# Patient Record
Sex: MALE | Race: ASIAN | NOT HISPANIC OR LATINO | URBAN - METROPOLITAN AREA
[De-identification: names, ages, dates, MRNs, and addresses within clinical notes are randomized per-mention and may not be internally consistent; named-entity substitution may affect disease eponyms.]

---

## 2020-05-01 ENCOUNTER — INPATIENT (INPATIENT)
Facility: HOSPITAL | Age: 65
LOS: 2 days | Discharge: HOME | End: 2020-05-04
Attending: INTERNAL MEDICINE | Admitting: INTERNAL MEDICINE
Payer: COMMERCIAL

## 2020-05-01 VITALS
OXYGEN SATURATION: 98 % | RESPIRATION RATE: 18 BRPM | HEART RATE: 69 BPM | SYSTOLIC BLOOD PRESSURE: 174 MMHG | DIASTOLIC BLOOD PRESSURE: 76 MMHG | TEMPERATURE: 97 F

## 2020-05-01 LAB
ALBUMIN SERPL ELPH-MCNC: 4.4 G/DL — SIGNIFICANT CHANGE UP (ref 3.5–5.2)
ALP SERPL-CCNC: 49 U/L — SIGNIFICANT CHANGE UP (ref 30–115)
ALT FLD-CCNC: 24 U/L — SIGNIFICANT CHANGE UP (ref 0–41)
AMMONIA BLD-MCNC: 30 UMOL/L — SIGNIFICANT CHANGE UP (ref 11–55)
ANION GAP SERPL CALC-SCNC: 12 MMOL/L — SIGNIFICANT CHANGE UP (ref 7–14)
APTT BLD: 34 SEC — SIGNIFICANT CHANGE UP (ref 27–39.2)
AST SERPL-CCNC: 24 U/L — SIGNIFICANT CHANGE UP (ref 0–41)
BASOPHILS # BLD AUTO: 0.1 K/UL — SIGNIFICANT CHANGE UP (ref 0–0.2)
BASOPHILS NFR BLD AUTO: 1.1 % — HIGH (ref 0–1)
BILIRUB SERPL-MCNC: 0.7 MG/DL — SIGNIFICANT CHANGE UP (ref 0.2–1.2)
BUN SERPL-MCNC: 20 MG/DL — SIGNIFICANT CHANGE UP (ref 10–20)
CALCIUM SERPL-MCNC: 9.6 MG/DL — SIGNIFICANT CHANGE UP (ref 8.5–10.1)
CHLORIDE SERPL-SCNC: 102 MMOL/L — SIGNIFICANT CHANGE UP (ref 98–110)
CO2 SERPL-SCNC: 27 MMOL/L — SIGNIFICANT CHANGE UP (ref 17–32)
CREAT SERPL-MCNC: 1 MG/DL — SIGNIFICANT CHANGE UP (ref 0.7–1.5)
EOSINOPHIL # BLD AUTO: 0.45 K/UL — SIGNIFICANT CHANGE UP (ref 0–0.7)
EOSINOPHIL NFR BLD AUTO: 5 % — SIGNIFICANT CHANGE UP (ref 0–8)
GLUCOSE SERPL-MCNC: 144 MG/DL — HIGH (ref 70–99)
HCT VFR BLD CALC: 48.9 % — SIGNIFICANT CHANGE UP (ref 42–52)
HGB BLD-MCNC: 16.4 G/DL — SIGNIFICANT CHANGE UP (ref 14–18)
IMM GRANULOCYTES NFR BLD AUTO: 0.9 % — HIGH (ref 0.1–0.3)
INR BLD: 1.09 RATIO — SIGNIFICANT CHANGE UP (ref 0.65–1.3)
LYMPHOCYTES # BLD AUTO: 2.26 K/UL — SIGNIFICANT CHANGE UP (ref 1.2–3.4)
LYMPHOCYTES # BLD AUTO: 25.1 % — SIGNIFICANT CHANGE UP (ref 20.5–51.1)
MAGNESIUM SERPL-MCNC: 2 MG/DL — SIGNIFICANT CHANGE UP (ref 1.8–2.4)
MCHC RBC-ENTMCNC: 30.8 PG — SIGNIFICANT CHANGE UP (ref 27–31)
MCHC RBC-ENTMCNC: 33.5 G/DL — SIGNIFICANT CHANGE UP (ref 32–37)
MCV RBC AUTO: 91.9 FL — SIGNIFICANT CHANGE UP (ref 80–94)
MONOCYTES # BLD AUTO: 0.76 K/UL — HIGH (ref 0.1–0.6)
MONOCYTES NFR BLD AUTO: 8.4 % — SIGNIFICANT CHANGE UP (ref 1.7–9.3)
NEUTROPHILS # BLD AUTO: 5.35 K/UL — SIGNIFICANT CHANGE UP (ref 1.4–6.5)
NEUTROPHILS NFR BLD AUTO: 59.5 % — SIGNIFICANT CHANGE UP (ref 42.2–75.2)
NRBC # BLD: 0 /100 WBCS — SIGNIFICANT CHANGE UP (ref 0–0)
PLATELET # BLD AUTO: 247 K/UL — SIGNIFICANT CHANGE UP (ref 130–400)
POTASSIUM SERPL-MCNC: 4.4 MMOL/L — SIGNIFICANT CHANGE UP (ref 3.5–5)
POTASSIUM SERPL-SCNC: 4.4 MMOL/L — SIGNIFICANT CHANGE UP (ref 3.5–5)
PROT SERPL-MCNC: 6.9 G/DL — SIGNIFICANT CHANGE UP (ref 6–8)
PROTHROM AB SERPL-ACNC: 12.5 SEC — SIGNIFICANT CHANGE UP (ref 9.95–12.87)
RBC # BLD: 5.32 M/UL — SIGNIFICANT CHANGE UP (ref 4.7–6.1)
RBC # FLD: 12.3 % — SIGNIFICANT CHANGE UP (ref 11.5–14.5)
SODIUM SERPL-SCNC: 141 MMOL/L — SIGNIFICANT CHANGE UP (ref 135–146)
TROPONIN T SERPL-MCNC: <0.01 NG/ML — SIGNIFICANT CHANGE UP
WBC # BLD: 9 K/UL — SIGNIFICANT CHANGE UP (ref 4.8–10.8)
WBC # FLD AUTO: 9 K/UL — SIGNIFICANT CHANGE UP (ref 4.8–10.8)

## 2020-05-01 PROCEDURE — 70496 CT ANGIOGRAPHY HEAD: CPT | Mod: 26

## 2020-05-01 PROCEDURE — 70498 CT ANGIOGRAPHY NECK: CPT | Mod: 26

## 2020-05-01 PROCEDURE — 70450 CT HEAD/BRAIN W/O DYE: CPT | Mod: 26

## 2020-05-01 PROCEDURE — 71045 X-RAY EXAM CHEST 1 VIEW: CPT | Mod: 26

## 2020-05-01 PROCEDURE — 99285 EMERGENCY DEPT VISIT HI MDM: CPT

## 2020-05-01 PROCEDURE — 99222 1ST HOSP IP/OBS MODERATE 55: CPT

## 2020-05-01 RX ORDER — ASPIRIN/CALCIUM CARB/MAGNESIUM 324 MG
325 TABLET ORAL ONCE
Refills: 0 | Status: COMPLETED | OUTPATIENT
Start: 2020-05-01 | End: 2020-05-01

## 2020-05-01 RX ORDER — ATORVASTATIN CALCIUM 80 MG/1
80 TABLET, FILM COATED ORAL AT BEDTIME
Refills: 0 | Status: DISCONTINUED | OUTPATIENT
Start: 2020-05-01 | End: 2020-05-04

## 2020-05-01 RX ORDER — ENOXAPARIN SODIUM 100 MG/ML
40 INJECTION SUBCUTANEOUS DAILY
Refills: 0 | Status: DISCONTINUED | OUTPATIENT
Start: 2020-05-01 | End: 2020-05-03

## 2020-05-01 RX ADMIN — ATORVASTATIN CALCIUM 80 MILLIGRAM(S): 80 TABLET, FILM COATED ORAL at 22:12

## 2020-05-01 RX ADMIN — Medication 325 MILLIGRAM(S): at 16:29

## 2020-05-01 NOTE — CHART NOTE - NSCHARTNOTEFT_GEN_A_CORE
Stroke code called for 3 hours of tongue numbness and left facial numbness (subjective).  On exam NIHSS 0 but tongue deviated to right.  Able to swallow solids and liquids and speech clear.  No dizziness.  No past medical history reported.  CTH unremarkable on prelim reading by me.  No large vessel occlusions on CTA H+N on wet read.  Etiology of symptoms are currently unknown and range form stroke to peripheral nerve irritation.  Would recommend keeping for MRI brain and consider formal swallow evaluation.  Neurochecks should be q4 hours for any progression of symptoms would call back stat.  Until MRI done and negative would give aspirin 325mg x1 and 81mg qd as well as atorvastatin 40mg

## 2020-05-01 NOTE — ED ADULT NURSE NOTE - CHIEF COMPLAINT QUOTE
pt states aprox 3 hours prior to arrival he began feeling numbness in the top right side of his head and tongue numbness.

## 2020-05-01 NOTE — H&P ADULT - NEUROLOGICAL DETAILS
responds to pain/no spontaneous movement/alert and oriented x 3/responds to verbal commands/sensation intact/cranial nerves intact/normal strength

## 2020-05-01 NOTE — ED ADULT NURSE NOTE - NSIMPLEMENTINTERV_GEN_ALL_ED
Implemented All Universal Safety Interventions:  [place] to call system. Call bell, personal items and telephone within reach. Instruct patient to call for assistance. Room bathroom lighting operational. Non-slip footwear when patient is off stretcher. Physically safe environment: no spills, clutter or unnecessary equipment. Stretcher in lowest position, wheels locked, appropriate side rails in place.

## 2020-05-01 NOTE — ED PROVIDER NOTE - OBJECTIVE STATEMENT
64 year old male, no past medical history, who presents with R facial numbness that began 3 hours ago. Patient reports associated tongue numbness. He states he was drinking tea when he had acute onset of symptoms. Was able to eat and drink after onset of symptoms. No fever, chills, vision changes, tinnitus/changes in hearing, difficulty swallowing, drooling, slurred speech, neck pain. no recent illness, recent trauma. No hx similar.

## 2020-05-01 NOTE — ED PROVIDER NOTE - PHYSICAL EXAMINATION
CONST: Well appearing in NAD  EYES: PERRL, EOMI, Sclera and conjunctiva clear. Vision 20/20. No nystagmus   ENT: TM's clear B/L without drainage. Oropharynx normal appearing, no erythema or exudates. No abscess or swelling. Uvula midline. No temporal artery or mastoid tenderness.  NECK: Non-tender, no meningeal signs. normal ROM. supple   CARD: Normal S1 S2; Normal rate and rhythm  RESP: Equal BS B/L, No wheezes, rhonchi or rales. No distress  GI: Soft, non-tender, non-distended. no rebound or guarding.   MS: Normal ROM in all extremities. No midline spinal tenderness.   SKIN: Warm, dry, no acute rashes. Good turgor  NEURO: A&Ox3, No focal deficits. Tongue deviated to R. CN 2-12 intact. Strength 5/5 UE/LE bilaterally with no sensory deficits. Steady gait. Negative pronator drift. Normal finger to nose. No dysarthria. NIH 0

## 2020-05-01 NOTE — H&P ADULT - ASSESSMENT
65 y/o male with no pmh presenting for evaluation of right facial weakness/numbness and confusion that started this afternoon.     # 65 y/o male with no pmh presenting for evaluation of right facial weakness/numbness and confusion that started this afternoon. NIHSS on admission 0.     # Right sided facial numbness and weakness likely TIA  - neurological evaluation pending  - stroke code called  - CT/ CT angio head negative for findings  - as per neuro will do MR in tmrw 5/2  - neuro checks q1  - lipid panel ordered  - high dose statin started, follow neuro plan    Activity: ambulate as tolerated  diet: regular  dvt ppx: lovenox 40mg daily  gi ppx: not indicated  full code  dispo: from home

## 2020-05-01 NOTE — ED PROVIDER NOTE - CLINICAL SUMMARY MEDICAL DECISION MAKING FREE TEXT BOX
Patient presents for evaluation of tongue numbness starting 3 hours prior to evaluation on exam he has a normal neuro exam except right sided deviation of his tongue but no dysarthria or change in speech pattern based on the above stroke code called upon arrival.  we discussed case with dr anny olsen advised to obtain cta and admit patient for MRI patient updated and agrees with the plan of care

## 2020-05-01 NOTE — H&P ADULT - HISTORY OF PRESENT ILLNESS
65 y/o male with no pmh presenting for evaluation of right facial weakness/numbness and confusion that started this afternoon.  Patient explains that while eating lunch today he became unable to speak and started to spill his drink while drinking his tea.  He explains that this was unwitnessed.  During this time he felt no weakness in his arms or legs and was able to bring himself to his bed.  At his bed he took a nap.  The patient also explains that prior to this episode he had another episode earlier before lunch where he went to the wrong supermarket when going to  his brother.  He explains he was supposed to go to [employer] and ended up at another store.  No  chest pain, no headache, mo numbness, no tingling, no abd pain, no bowel abnormalities reported by the patient.

## 2020-05-01 NOTE — ED PROVIDER NOTE - ATTENDING CONTRIBUTION TO CARE
I was present for and supervised the key and critical aspects of the procedures performed during the care of the patient. patient presents for evaluation of right sided tongue numbness starting 3 hours prior to arrival, he denies any headache, visual changes, chest pain, sob, palpitations or focal deficits he is able to ambulate well with no change in speech pattern,   On physical exam the patient is nc./at perrla eomi oropharynx clear cta b/l, rrr s1s2 noted abd-soft nt nd bs+ ext from patient has no focal deficits strength is 5/5 with normal sensation, his tongue is deviated to the right.    A/P- based on the physical exam code stroke called upon arrival patient improved over ed course we obtained ct, consulted dr anny olsen per stroke code policies advised to admit for further management at this time

## 2020-05-02 ENCOUNTER — TRANSCRIPTION ENCOUNTER (OUTPATIENT)
Age: 65
End: 2020-05-02

## 2020-05-02 LAB
A1C WITH ESTIMATED AVERAGE GLUCOSE RESULT: 5.9 % — HIGH (ref 4–5.6)
A1C WITH ESTIMATED AVERAGE GLUCOSE RESULT: 5.9 % — HIGH (ref 4–5.6)
ANION GAP SERPL CALC-SCNC: 10 MMOL/L — SIGNIFICANT CHANGE UP (ref 7–14)
ANION GAP SERPL CALC-SCNC: 12 MMOL/L — SIGNIFICANT CHANGE UP (ref 7–14)
BASOPHILS # BLD AUTO: 0.11 K/UL — SIGNIFICANT CHANGE UP (ref 0–0.2)
BASOPHILS NFR BLD AUTO: 1.2 % — HIGH (ref 0–1)
BUN SERPL-MCNC: 18 MG/DL — SIGNIFICANT CHANGE UP (ref 10–20)
BUN SERPL-MCNC: 25 MG/DL — HIGH (ref 10–20)
CALCIUM SERPL-MCNC: 9.6 MG/DL — SIGNIFICANT CHANGE UP (ref 8.5–10.1)
CALCIUM SERPL-MCNC: 9.7 MG/DL — SIGNIFICANT CHANGE UP (ref 8.5–10.1)
CHLORIDE SERPL-SCNC: 105 MMOL/L — SIGNIFICANT CHANGE UP (ref 98–110)
CHLORIDE SERPL-SCNC: 105 MMOL/L — SIGNIFICANT CHANGE UP (ref 98–110)
CHOLEST SERPL-MCNC: 199 MG/DL — SIGNIFICANT CHANGE UP (ref 100–200)
CK MB CFR SERPL CALC: 2.5 NG/ML — SIGNIFICANT CHANGE UP (ref 0.6–6.3)
CK SERPL-CCNC: 93 U/L — SIGNIFICANT CHANGE UP (ref 0–225)
CO2 SERPL-SCNC: 25 MMOL/L — SIGNIFICANT CHANGE UP (ref 17–32)
CO2 SERPL-SCNC: 25 MMOL/L — SIGNIFICANT CHANGE UP (ref 17–32)
CREAT SERPL-MCNC: 0.9 MG/DL — SIGNIFICANT CHANGE UP (ref 0.7–1.5)
CREAT SERPL-MCNC: 1.2 MG/DL — SIGNIFICANT CHANGE UP (ref 0.7–1.5)
CRP SERPL-MCNC: 0.14 MG/DL — SIGNIFICANT CHANGE UP (ref 0–0.4)
EOSINOPHIL # BLD AUTO: 0.51 K/UL — SIGNIFICANT CHANGE UP (ref 0–0.7)
EOSINOPHIL NFR BLD AUTO: 5.7 % — SIGNIFICANT CHANGE UP (ref 0–8)
ERYTHROCYTE [SEDIMENTATION RATE] IN BLOOD: 3 MM/HR — SIGNIFICANT CHANGE UP (ref 0–10)
ESTIMATED AVERAGE GLUCOSE: 123 MG/DL — HIGH (ref 68–114)
ESTIMATED AVERAGE GLUCOSE: 123 MG/DL — HIGH (ref 68–114)
GLUCOSE SERPL-MCNC: 105 MG/DL — HIGH (ref 70–99)
GLUCOSE SERPL-MCNC: 131 MG/DL — HIGH (ref 70–99)
HCT VFR BLD CALC: 46.3 % — SIGNIFICANT CHANGE UP (ref 42–52)
HCV AB S/CO SERPL IA: 0.04 COI — SIGNIFICANT CHANGE UP
HCV AB SERPL-IMP: SIGNIFICANT CHANGE UP
HDLC SERPL-MCNC: 42 MG/DL — SIGNIFICANT CHANGE UP
HGB BLD-MCNC: 15.8 G/DL — SIGNIFICANT CHANGE UP (ref 14–18)
IMM GRANULOCYTES NFR BLD AUTO: 0.9 % — HIGH (ref 0.1–0.3)
INR BLD: 1.1 RATIO — SIGNIFICANT CHANGE UP (ref 0.65–1.3)
LIPID PNL WITH DIRECT LDL SERPL: 112 MG/DL — SIGNIFICANT CHANGE UP (ref 4–129)
LYMPHOCYTES # BLD AUTO: 1.91 K/UL — SIGNIFICANT CHANGE UP (ref 1.2–3.4)
LYMPHOCYTES # BLD AUTO: 21.5 % — SIGNIFICANT CHANGE UP (ref 20.5–51.1)
MAGNESIUM SERPL-MCNC: 1.9 MG/DL — SIGNIFICANT CHANGE UP (ref 1.8–2.4)
MCHC RBC-ENTMCNC: 31 PG — SIGNIFICANT CHANGE UP (ref 27–31)
MCHC RBC-ENTMCNC: 34.1 G/DL — SIGNIFICANT CHANGE UP (ref 32–37)
MCV RBC AUTO: 91 FL — SIGNIFICANT CHANGE UP (ref 80–94)
MONOCYTES # BLD AUTO: 0.87 K/UL — HIGH (ref 0.1–0.6)
MONOCYTES NFR BLD AUTO: 9.8 % — HIGH (ref 1.7–9.3)
NEUTROPHILS # BLD AUTO: 5.42 K/UL — SIGNIFICANT CHANGE UP (ref 1.4–6.5)
NEUTROPHILS NFR BLD AUTO: 60.9 % — SIGNIFICANT CHANGE UP (ref 42.2–75.2)
NRBC # BLD: 0 /100 WBCS — SIGNIFICANT CHANGE UP (ref 0–0)
PLATELET # BLD AUTO: 234 K/UL — SIGNIFICANT CHANGE UP (ref 130–400)
POTASSIUM SERPL-MCNC: 4.1 MMOL/L — SIGNIFICANT CHANGE UP (ref 3.5–5)
POTASSIUM SERPL-MCNC: 4.2 MMOL/L — SIGNIFICANT CHANGE UP (ref 3.5–5)
POTASSIUM SERPL-SCNC: 4.1 MMOL/L — SIGNIFICANT CHANGE UP (ref 3.5–5)
POTASSIUM SERPL-SCNC: 4.2 MMOL/L — SIGNIFICANT CHANGE UP (ref 3.5–5)
PROTHROM AB SERPL-ACNC: 12.6 SEC — SIGNIFICANT CHANGE UP (ref 9.95–12.87)
RBC # BLD: 5.09 M/UL — SIGNIFICANT CHANGE UP (ref 4.7–6.1)
RBC # FLD: 12 % — SIGNIFICANT CHANGE UP (ref 11.5–14.5)
SODIUM SERPL-SCNC: 140 MMOL/L — SIGNIFICANT CHANGE UP (ref 135–146)
SODIUM SERPL-SCNC: 142 MMOL/L — SIGNIFICANT CHANGE UP (ref 135–146)
TOTAL CHOLESTEROL/HDL RATIO MEASUREMENT: 4.7 RATIO — SIGNIFICANT CHANGE UP (ref 4–5.5)
TRIGL SERPL-MCNC: 465 MG/DL — HIGH (ref 10–149)
TROPONIN T SERPL-MCNC: <0.01 NG/ML — SIGNIFICANT CHANGE UP
TROPONIN T SERPL-MCNC: <0.01 NG/ML — SIGNIFICANT CHANGE UP
WBC # BLD: 8.9 K/UL — SIGNIFICANT CHANGE UP (ref 4.8–10.8)
WBC # FLD AUTO: 8.9 K/UL — SIGNIFICANT CHANGE UP (ref 4.8–10.8)

## 2020-05-02 PROCEDURE — 70551 MRI BRAIN STEM W/O DYE: CPT | Mod: 26

## 2020-05-02 PROCEDURE — 99223 1ST HOSP IP/OBS HIGH 75: CPT

## 2020-05-02 PROCEDURE — 99233 SBSQ HOSP IP/OBS HIGH 50: CPT

## 2020-05-02 RX ORDER — ASPIRIN/CALCIUM CARB/MAGNESIUM 324 MG
1 TABLET ORAL
Qty: 30 | Refills: 1
Start: 2020-05-02 | End: 2020-06-30

## 2020-05-02 RX ORDER — ASPIRIN/CALCIUM CARB/MAGNESIUM 324 MG
325 TABLET ORAL DAILY
Refills: 0 | Status: DISCONTINUED | OUTPATIENT
Start: 2020-05-02 | End: 2020-05-03

## 2020-05-02 RX ORDER — ATORVASTATIN CALCIUM 80 MG/1
1 TABLET, FILM COATED ORAL
Qty: 30 | Refills: 1
Start: 2020-05-02 | End: 2020-06-30

## 2020-05-02 RX ORDER — CLOPIDOGREL BISULFATE 75 MG/1
75 TABLET, FILM COATED ORAL DAILY
Refills: 0 | Status: DISCONTINUED | OUTPATIENT
Start: 2020-05-02 | End: 2020-05-02

## 2020-05-02 RX ADMIN — Medication 325 MILLIGRAM(S): at 17:04

## 2020-05-02 RX ADMIN — ENOXAPARIN SODIUM 40 MILLIGRAM(S): 100 INJECTION SUBCUTANEOUS at 17:04

## 2020-05-02 RX ADMIN — ATORVASTATIN CALCIUM 80 MILLIGRAM(S): 80 TABLET, FILM COATED ORAL at 21:09

## 2020-05-02 NOTE — OCCUPATIONAL THERAPY INITIAL EVALUATION ADULT - GENERAL OBSERVATIONS, REHAB EVAL
10:00-10:28 Chart reviewed. Patient ok to be seen as confirmed with RN. Patient encountered in bed supine in NAD agreeable for treatment.

## 2020-05-02 NOTE — CHART NOTE - NSCHARTNOTEFT_GEN_A_CORE
Called and spoke with patient's sister-in-law Dr. Saeed #[phone] who stated that the patient has a history of paroxysmal Atrial fibrillation in the remote past (10 years ago) but no further work up was done at that time.  The patient has had reportedly echocardiogram and stress testing in the past but he did not have any issues as he is a former .  Dr. Saeed suspects based on the pattern of infarcts that the patient may have embolic source of stroke.  She requests cardiology consult to be added.  Will see if cardiology can help set patient up to be ambulatory cardiac monitoring.  Will check with cardiology and neurology tomorrow about possibility of patient needing anticoagulation.

## 2020-05-02 NOTE — CONSULT NOTE ADULT - SUBJECTIVE AND OBJECTIVE BOX
Neurology Consult    Patient is a 64y old  Male who presents with a chief complaint of right sided facial weakness/numbness (01 May 2020 18:27)    HPI:  63 y/o male with no pmh presenting for evaluation of right facial weakness/numbness and confusion that started this afternoon.  Patient explains that while eating lunch today he became unable to speak and started to spill his drink while drinking his tea.  He explains that this was unwitnessed.  During this time he felt no weakness in his arms or legs and was able to bring himself to his bed.  At his bed he took a nap.  The patient also explains that prior to this episode he had another episode earlier before lunch where he went to the wrong supermarket when going to  his brother.  He explains he was supposed to go to [employer] and ended up at another store.  No  chest pain, no headache, mo numbness, no tingling, no abd pain, no bowel abnormalities reported by the patient. (01 May 2020 18:27)    PAST MEDICAL & SURGICAL HISTORY:  No pertinent past medical history  No significant past surgical history    FAMILY HISTORY:  No pertinent family history in first degree relatives    Social History: (-) x 3    Allergies    No Known Allergies    Intolerances    MEDICATIONS  (STANDING):  atorvastatin 80 milliGRAM(s) Oral at bedtime  enoxaparin Injectable 40 milliGRAM(s) SubCutaneous daily    MEDICATIONS  (PRN):    Review of systems:    Constitutional: as per HPI  Eyes: No eye pain or discharge  ENMT:  No difficulty hearing; No sinus or throat pain  Neck: No pain or stiffness  Respiratory: No cough, wheezing, chills or hemoptysis  Cardiovascular: No chest pain, palpitations, shortness of breath, dyspnea on exertion  Gastrointestinal: No abdominal pain, nausea, vomiting or hematemesis; No diarrhea or constipation.   Genitourinary: No dysuria, frequency, hematuria or incontinence  Neurological: As per HPI  Skin: No rashes or lesions   Endocrine: No heat or cold intolerance; No hair loss  Musculoskeletal: No joint pain or swelling  Psychiatric: No depression, anxiety, mood swings  Heme/Lymph: No easy bruising or bleeding gums    Vital Signs Last 24 Hrs  T(C): 36.2 (02 May 2020 08:19), Max: 36.6 (01 May 2020 18:24)  T(F): 97.1 (02 May 2020 08:19), Max: 97.8 (01 May 2020 18:24)  HR: 64 (02 May 2020 08:19) (58 - 84)  BP: 133/85 (02 May 2020 08:19) (124/75 - 174/76)  BP(mean): --  RR: 18 (02 May 2020 08:19) (18 - 18)  SpO2: 99% (01 May 2020 21:40) (98% - 100%)    Examination:  General:  Appearance is consistent with chronologic age.  No abnormal facies.  Gross skin survey within normal limits.    Cognitive/Language:  The patient is oriented to person, place, time and date.  Recent and remote memory intact.  Fund of knowledge is intact and normal.  Language with normal repetition, comprehension and naming.  Nondysarthric.    Eyes: intact VA, VFF.  EOMI w/o nystagmus, skew or reported double vision.  PERRL.  No ptosis/weakness of eyelid closure.    Face:  Facial sensation normal V1 - 3, no facial asymmetry.    Ears/Nose/Throat:  Hearing grossly intact b/l.  Palate elevates midline.  Tongue and uvula midline.   Motor examination:   Normal tone, bulk and range of motion.  No tenderness, twitching, tremors or involuntary movements.  Formal Muscle Strength Testing: (MRC grade R/L) 5/5 UE; 5/5 LE.  No observable drift.  Reflexes:   2+ b/l pectoralis, biceps, triceps, brachioradialis, patella and Achilles.  Plantar response downgoing b/l.  Jaw jerk, Terry, clonus absent.  Sensory examination:   Intact to light touch and pinprick, pain, temperature and proprioception and vibration in all extremities.  Cerebellum:   FTN/HKS intact with normal ALEXANDRA in all limbs.  No dysmetria or dysdiadokinesia.  Gait narrow based and normal.    Respiratory:  no audible wheezing or inspiratory stridor.  no use of accessory muscles.   Cardiac: pulse palpable, no audible bruits  Abdomen: supple, no guarding, no TTP    Labs:   CBC Full  -  ( 02 May 2020 06:35 )  WBC Count : 8.90 K/uL  RBC Count : 5.09 M/uL  Hemoglobin : 15.8 g/dL  Hematocrit : 46.3 %  Platelet Count - Automated : 234 K/uL  Mean Cell Volume : 91.0 fL  Mean Cell Hemoglobin : 31.0 pg  Mean Cell Hemoglobin Concentration : 34.1 g/dL  Auto Neutrophil # : 5.42 K/uL  Auto Lymphocyte # : 1.91 K/uL  Auto Monocyte # : 0.87 K/uL  Auto Eosinophil # : 0.51 K/uL  Auto Basophil # : 0.11 K/uL  Auto Neutrophil % : 60.9 %  Auto Lymphocyte % : 21.5 %  Auto Monocyte % : 9.8 %  Auto Eosinophil % : 5.7 %  Auto Basophil % : 1.2 %    05-02    142  |  105  |  25<H>  ----------------------------<  105<H>  4.2   |  25  |  0.9    Ca    9.7      02 May 2020 06:35  Mg     2.0     05-01    TPro  6.9  /  Alb  4.4  /  TBili  0.7  /  DBili  x   /  AST  24  /  ALT  24  /  AlkPhos  49  05-01    LIVER FUNCTIONS - ( 01 May 2020 16:10 )  Alb: 4.4 g/dL / Pro: 6.9 g/dL / ALK PHOS: 49 U/L / ALT: 24 U/L / AST: 24 U/L / GGT: x           PT/INR - ( 02 May 2020 06:35 )   PT: 12.60 sec;   INR: 1.10 ratio         PTT - ( 01 May 2020 16:10 )  PTT:34.0 sec  05-02-20 @ 09:38    < from: CT Brain Stroke Protocol (05.01.20 @ 15:48) >  IMPRESSION:     No acute intracranial hemorrhage or acute large territorial infarct.    MRI of the brain without and with contrast is recommended for further evaluation if not contraindicated in this patient.    Spoke with DR SVITLANA POSADA,  on 5/1/2020 3:56 PM with readback.    EMILIE SCHMIDT M.D., ATTENDING RADIOLOGIST  This document has been electronically signed. May  1 2020  3:58PM    < end of copied text >    < from: CT Angio Neck w/ IV Cont (05.01.20 @ 15:58) >  IMPRESSION:     No CTA evidence of major vascular stenoses or occlusions.    ANA ROSA MARSH M.D., RESIDENT RADIOLOGIST  This document has been electronically signed.  MALIKA HENDRICKSON M.D., ATTENDING RADIOLOGIST  This document has been electronically signed. May  1 2020  5:51PM    < end of copied text >    < from: CT Angio Head w/ IV Cont (05.01.20 @ 15:58) >  IMPRESSION:     No CTA evidence of major vascular stenoses or occlusions.    ANA ROSA MARSH M.D., RESIDENT RADIOLOGIST  This document has been electronically signed.  MALIKA HENDRICKSON M.D., ATTENDING RADIOLOGIST  This document has been electronically signed. May  1 2020  5:51PM    < end of copied text > Neurology Consult    Patient is a 64y old  Male who presents with a chief complaint of right sided facial weakness/numbness (01 May 2020 18:27)    HPI:  63 y/o male with no pmh presenting for evaluation of right facial weakness/numbness started yesterday afternoon occurred acutely with spilling tea from his mouth associated with dysarthria and numbness in the L tongue and L face in V1- 3 distribution.  Denies any symptoms in the arms or legs.  Denies any facial asymmetry.  No visual changes or swallowing difficulty.  No ataxia or incoordination but had some dizziness.  No focal weakness.  Symptoms persisted through last night and this morning is asymptomatic with no focal deficits.  Denies prior episodes.  Denies any ear pain of headaches.      PAST MEDICAL & SURGICAL HISTORY:  No pertinent past medical history  No significant past surgical history    FAMILY HISTORY:  No pertinent family history in first degree relatives    Social History: (-) x 3    Allergies    No Known Allergies    Intolerances    MEDICATIONS  (STANDING):  atorvastatin 80 milliGRAM(s) Oral at bedtime  enoxaparin Injectable 40 milliGRAM(s) SubCutaneous daily    MEDICATIONS  (PRN):    Review of systems:    Constitutional: as per HPI  Eyes: No eye pain or discharge  ENMT:  No difficulty hearing; No sinus or throat pain  Neck: No pain or stiffness  Respiratory: No cough, wheezing, chills or hemoptysis  Cardiovascular: No chest pain, palpitations, shortness of breath, dyspnea on exertion  Gastrointestinal: No abdominal pain, nausea, vomiting or hematemesis; No diarrhea or constipation.   Genitourinary: No dysuria, frequency, hematuria or incontinence  Neurological: As per HPI  Skin: No rashes or lesions   Endocrine: No heat or cold intolerance; No hair loss  Musculoskeletal: No joint pain or swelling  Psychiatric: No depression, anxiety, mood swings  Heme/Lymph: No easy bruising or bleeding gums    Vital Signs Last 24 Hrs  T(C): 36.2 (02 May 2020 08:19), Max: 36.6 (01 May 2020 18:24)  T(F): 97.1 (02 May 2020 08:19), Max: 97.8 (01 May 2020 18:24)  HR: 64 (02 May 2020 08:19) (58 - 84)  BP: 133/85 (02 May 2020 08:19) (124/75 - 174/76)  BP(mean): --  RR: 18 (02 May 2020 08:19) (18 - 18)  SpO2: 99% (01 May 2020 21:40) (98% - 100%)    Examination:  General:  Appearance is consistent with chronologic age.  No abnormal facies.  Gross skin survey within normal limits.    Cognitive/Language:  The patient is oriented to person, place, time and date.  Recent and remote memory intact.  Fund of knowledge is intact and normal.  Language with normal repetition, comprehension and naming.  Nondysarthric.    Eyes: intact VA, VFF.  EOMI w/o nystagmus, skew or reported double vision.  PERRL.  No ptosis/weakness of eyelid closure.    Face:  Facial sensation normal V1 - 3, no facial asymmetry.    Ears/Nose/Throat:  Hearing grossly intact b/l.  Palate elevates midline.  Tongue and uvula midline.   Motor examination:   Normal tone, bulk and range of motion.  No tenderness, twitching, tremors or involuntary movements.  Formal Muscle Strength Testing: (MRC grade R/L) 5/5 UE; 5/5 LE.  No observable drift.  Reflexes:   2+ b/l pectoralis, biceps, triceps, brachioradialis, patella and Achilles.  Plantar response downgoing b/l.  Jaw jerk, Terry, clonus absent.  Sensory examination:   Intact to light touch and pinprick, pain, temperature and proprioception and vibration in all extremities.  Cerebellum:   FTN/HKS intact with normal ALEXANDRA in all limbs.  No dysmetria or dysdiadokinesia.  Gait narrow based and normal.    NIHSS 0    Labs:   CBC Full  -  ( 02 May 2020 06:35 )  WBC Count : 8.90 K/uL  RBC Count : 5.09 M/uL  Hemoglobin : 15.8 g/dL  Hematocrit : 46.3 %  Platelet Count - Automated : 234 K/uL  Mean Cell Volume : 91.0 fL  Mean Cell Hemoglobin : 31.0 pg  Mean Cell Hemoglobin Concentration : 34.1 g/dL  Auto Neutrophil # : 5.42 K/uL  Auto Lymphocyte # : 1.91 K/uL  Auto Monocyte # : 0.87 K/uL  Auto Eosinophil # : 0.51 K/uL  Auto Basophil # : 0.11 K/uL  Auto Neutrophil % : 60.9 %  Auto Lymphocyte % : 21.5 %  Auto Monocyte % : 9.8 %  Auto Eosinophil % : 5.7 %  Auto Basophil % : 1.2 %    05-02    142  |  105  |  25<H>  ----------------------------<  105<H>  4.2   |  25  |  0.9    Ca    9.7      02 May 2020 06:35  Mg     2.0     05-01    TPro  6.9  /  Alb  4.4  /  TBili  0.7  /  DBili  x   /  AST  24  /  ALT  24  /  AlkPhos  49  05-01    LIVER FUNCTIONS - ( 01 May 2020 16:10 )  Alb: 4.4 g/dL / Pro: 6.9 g/dL / ALK PHOS: 49 U/L / ALT: 24 U/L / AST: 24 U/L / GGT: x           PT/INR - ( 02 May 2020 06:35 )   PT: 12.60 sec;   INR: 1.10 ratio         PTT - ( 01 May 2020 16:10 )  PTT:34.0 sec  05-02-20 @ 09:38    < from: CT Brain Stroke Protocol (05.01.20 @ 15:48) >  IMPRESSION:     No acute intracranial hemorrhage or acute large territorial infarct.    MRI of the brain without and with contrast is recommended for further evaluation if not contraindicated in this patient.    Spoke with DR SVITLANA POSADA,  on 5/1/2020 3:56 PM with readback.    EMILIE SCHMIDT M.D., ATTENDING RADIOLOGIST  This document has been electronically signed. May  1 2020  3:58PM    < end of copied text >    < from: CT Angio Neck w/ IV Cont (05.01.20 @ 15:58) >  IMPRESSION:     No CTA evidence of major vascular stenoses or occlusions.    ANA ROSA MARSH M.D., RESIDENT RADIOLOGIST  This document has been electronically signed.  MALIKA HENDRICKSON M.D., ATTENDING RADIOLOGIST  This document has been electronically signed. May  1 2020  5:51PM    < end of copied text >    < from: CT Angio Head w/ IV Cont (05.01.20 @ 15:58) >  IMPRESSION:     No CTA evidence of major vascular stenoses or occlusions.    ANA ROSA MARSH M.D., RESIDENT RADIOLOGIST  This document has been electronically signed.  MALIKA HENDRICKSON M.D., ATTENDING RADIOLOGIST  This document has been electronically signed. May  1 2020  5:51PM    < end of copied text >

## 2020-05-02 NOTE — CONSULT NOTE ADULT - ASSESSMENT
63 yo M s/ [facility] p/w acute L facial weakness/numbness 65 yo M s/ [facility] p/w acute L facial weakness/numbness and dysarthria currently resolved possible TIA vs lacunar infarct.      Recommendations:  - ASA 81 mg PO QD  - lipitor 80 mg PO QD  - MRI Brain NC  - Echo  - continue cardiac telemetry  - if MRI/Echo (-), may d/c with outpt f/u in stroke clinic w/n 2 weeks

## 2020-05-02 NOTE — DISCHARGE NOTE PROVIDER - CARE PROVIDERS DIRECT ADDRESSES
,[email or web address] ,[email or web address],[email or web address] [email or web address] ,[email or web address]

## 2020-05-02 NOTE — DISCHARGE NOTE PROVIDER - NSDCMRMEDTOKEN_GEN_ALL_CORE_FT
aspirin 325 mg oral delayed release tablet: 1 tab(s) orally once a day  atorvastatin 80 mg oral tablet: 1 tab(s) orally once a day (at bedtime) apixaban 5 mg oral tablet: 1 tab(s) orally every 12 hours  atorvastatin 80 mg oral tablet: 1 tab(s) orally once a day (at bedtime)  Toprol-XL 50 mg oral tablet, extended release: 1 tab(s) orally once a day

## 2020-05-02 NOTE — PROGRESS NOTE ADULT - ASSESSMENT
64 M with no PMHx who p/w TIA    # TIA   # HLD  planning for MRI head , also awaiting eval.s from  speech / PT / OT although pt symptoms totally resolved now  Will check Echo, carotid US as well, cont tele monitoring   given ABCD2 score of 4 or 5, pt has high risk TIA and will treat with ASA/Plavix for 21 days followed by ASA monotherapy along with high dose statin  Neurology consulted, following  likely pt may benefit from 30 day ambulatory telemetry monitoring upon dispo planning    dispo likely in 24-48hrs 64 M with no PMHx who p/w TIA    # TIA   # HLD  planning for MRI head , also awaiting eval.s from  speech / PT / OT although pt symptoms totally resolved now  Will check Echo also and   cont tele monitoring   given ABCD2 score of 4 or 5, pt has high risk TIA and will treat with ASA/Plavix for 21 days followed by ASA monotherapy along with high dose statin  Neurology consulted, following  likely pt may benefit from 30 day ambulatory telemetry monitoring upon dispo planning    dispo likely in 24-48hrs 64 M with no PMHx who p/w TIA    # TIA   # HLD  planning for MRI head , also awaiting eval.  from  speech / PT / OT although pt symptoms totally resolved now  Will check Echo also and   cont tele monitoring   will treat with ASA along with high dose statin  Neurology consulted, following  likely pt may benefit from 30 day ambulatory telemetry monitoring upon dispo planning  dispo likely in 24-48hrs 64 M with no PMHx who p/w TIA    # TIA   # HLD  planning for MRI head  (pt symptoms totally resolved now and Speech/PT/OT consults may no longer be necessary)  Will check Echo also and   cont tele monitoring   will treat with ASA along with high dose statin  Neurology consulted, following  likely pt may benefit from 30 day ambulatory telemetry monitoring upon dispo planning  dispo likely in 24-48hrs 64 M with no PMHx who p/w TIA    # Acute CVA  # HLD  PT/OT have seen.  MRI done and resulted above   Echo , tele monitoring   will treat with ASA along with high dose statin  Neurology consulted, following  likely pt may benefit from 30 day ambulatory telemetry monitoring upon dispo planning  dispo likely in 24-48hrs

## 2020-05-02 NOTE — DISCHARGE NOTE PROVIDER - PROVIDER TOKENS
PROVIDER:[TOKEN:[[id number]],FOLLOWUP:[1 month]],PROVIDER:[TOKEN:[[id number]],FOLLOWUP:[1 week]],PROVIDER:[TOKEN:[[id number]],FOLLOWUP:[1 week]] PROVIDER:[TOKEN:[[id number]],FOLLOWUP:[1 week]],PROVIDER:[TOKEN:[[id number]],FOLLOWUP:[1 week]],PROVIDER:[TOKEN:[[id number]],FOLLOWUP:[1 week]],PROVIDER:[TOKEN:[[id number]],FOLLOWUP:[1 week]] PROVIDER:[TOKEN:[[id number]],FOLLOWUP:[1 week]],PROVIDER:[TOKEN:[[id number]],FOLLOWUP:[1 week]]

## 2020-05-02 NOTE — CHART NOTE - NSCHARTNOTEFT_GEN_A_CORE
Pt c/o chest pressure and mild SOB x1 hour- now improving. VS: afebrile, HR 73, /93, O2 98% on RA. Breathing comfortably, speaking in clear sentences. HR palpated- irregular. Per chart review, has h/o paroxysmal Afib.    EKG: A flutter at 63.  Sent CE and BMP/Mg  Ordered CXR    D/w Dr. Hull. No acute intervention indicated. Will follow.    Cardiology to see in AM. Pt c/o chest pressure and mild SOB x1 hour- now improving. VS: afebrile, HR 73, /93, O2 98% on RA. Breathing comfortably, speaking in clear sentences. Radial pulse palpated- irregular. Per chart review, has h/o paroxysmal Afib.    EKG: A flutter at 63.  Sent CE and BMP/Mg- all WNL  Ordered CXR, will follow up results    D/w Dr. Hull. No acute intervention indicated. Will follow.    Cardiology to see in AM. Pt c/o chest pressure and mild SOB x1 hour- now improving. VS: afebrile, HR 73, /93, O2 98% on RA. Breathing comfortably, speaking in clear sentences. Radial pulse palpated- irregular. Per chart review, has h/o paroxysmal Afib.    EKG: A flutter at 63.  Sent CE and BMP/Mg- all WNL  CXR unremarkable; will f/u official read    D/w Dr. Hull. No acute intervention indicated. Will follow.    Cardiology to see in AM.

## 2020-05-02 NOTE — DISCHARGE NOTE PROVIDER - CARE PROVIDER_API CALL
Benjamín Lancaster)  Neuromuscular Medicine  [address] [facility], [address]  [place], [place] [id number]  Phone: [phone]  Fax: [phone]  Follow Up Time: 1 month    Poli Sun)  Cardiovascular Disease; Interventional Cardiology  [address] [place] [address], [address]  [place] [address]  Phone: [phone]  Fax: [phone]  Follow Up Time: 1 week    Patricio Randolph)  Cardiovascular Disease; Internal Medicine  [address] [place] [address]  Phone: [phone]  Fax: [phone]  Follow Up Time: 1 week Poli Sun)  Cardiovascular Disease; Interventional Cardiology  [address] [facility], [address] [address]  Phone: [phone]  Fax: [phone]  Follow Up Time: 1 week    Patricio Randolph)  Cardiovascular Disease; Internal Medicine  [address] [place] [address]  Phone: [phone]  Fax: [phone]  Follow Up Time: 1 week    Tho Issa)  EEGEpilepsy; Neurology  [address] [place] [address], [address] [place] [address]  Phone: [phone]  Fax: [phone]  Follow Up Time: 1 week    Benjamín Lancaster)  Neuromuscular Medicine  [address] [place] [address], [address] [id number]  Phone: [phone]  Fax: [phone]  Follow Up Time: 1 week Patricio Randolph)  Cardiovascular Disease; Internal Medicine  [address] [place] [address]  Phone: [phone]  Fax: [phone]  Follow Up Time: 1 week    Benjamín Lancaster)  Neuromuscular Medicine  [address] [place] [address], [address]  [place], [place] [id number]  Phone: [phone]  Fax: [phone]  Follow Up Time: 1 week

## 2020-05-02 NOTE — PROGRESS NOTE ADULT - SUBJECTIVE AND OBJECTIVE BOX
SUBJECTIVE:    Patient states that his symptoms of numbness of face and tongue and difficulty with tongue movement have subsided completely and he is back to baseline.    *********************** VITALS ******************************************  Vital Signs Last 24 Hrs  T(C): 36.2 (02 May 2020 08:19), Max: 36.6 (01 May 2020 18:24)  T(F): 97.1 (02 May 2020 08:19), Max: 97.8 (01 May 2020 18:24)  HR: 64 (02 May 2020 08:19) (58 - 84)  BP: 133/85 (02 May 2020 08:19) (124/75 - 174/76)  BP(mean): --  RR: 18 (02 May 2020 08:19) (18 - 18)  SpO2: 99% (01 May 2020 21:40) (98% - 100%)    ******************************** PHYSICAL EXAM:**************************************************  GENERAL:  NAD     PSYCH: no agitation     HEENT:   EOMI     NERVOUS SYSTEM:  Alert & Oriented X3     PULMONARY:  patient is breathing comfortably    CARDIOVASCULAR:  RRR, S1 S2 audible      ABDOMEN: Soft, NT, ND     EXTREMITIES:  warm          LABS:                        15.8   8.90  )-----------( 234      ( 02 May 2020 06:35 )             46.3       05-02    142  |  105  |  25<H>  ----------------------------<  105<H>  4.2   |  25  |  0.9    Ca    9.7      02 May 2020 06:35  Mg     2.0     05-01    TPro  6.9  /  Alb  4.4  /  TBili  0.7  /  DBili  x   /  AST  24  /  ALT  24  /  AlkPhos  49  05-01      LIVER FUNCTIONS - ( 01 May 2020 16:10 )  Alb: 4.4 g/dL / Pro: 6.9 g/dL / ALK PHOS: 49 U/L / ALT: 24 U/L / AST: 24 U/L / GGT: x           Lipid Profile (05.02.20 @ 06:35)    Total Cholesterol/HDL Ratio Measurement: 4.7 Ratio    Cholesterol, Serum: 199 mg/dL    Triglycerides, Serum: 465 mg/dL    HDL Cholesterol, Serum: 42: HDL Levels >/= 60 mg/dL are considered beneficial and a "negative" risk  factor.  Effective 08/15/2018: New reference range and interpretive comment. mg/dL    Direct LDL: 112: LDL Cholesterol (mg/dL) --- Interpretive Comment (for adults 18 and over)  Optimal LDL Level may vary based on clinical situation  Below 70                   Ideal for people at very high risk of heart  disease  Below 100                  Ideal for people at risk of heart disease  100 - 129                    [place]  130 - 159                    Borderline high  160 - 189                    High  190 and Above           Very high mg/dL      < from: Xray Chest 1 View- PORTABLE-Urgent (05.01.20 @ 16:04) >  Impression:      Mild left basilar atelectasis.    < end of copied text >          < from: 12 Lead ECG (05.01.20 @ 15:53) >  Diagnosis Line Normal sinus rhythm  Incomplete right bundle branch block  Borderline ECG  Early transition  Nonspecific ST and T wave abnormality    < end of copied text >        < from: CT Angio Head & Neck w/ IV Cont (05.01.20 @ 15:58) >  IMPRESSION:     No CTA evidence of major vascular stenoses or occlusions.    < end of copied text >  < from: CT Brain Stroke Protocol (05.01.20 @ 15:48) >  IMPRESSION:     No acute intracranial hemorrhage or acute large territorial infarct.    MRI of the brain without and with contrast is recommended for further evaluation if not contraindicated in this patient.    < end of copied text > SUBJECTIVE:    Patient states that his symptoms of numbness of face and tongue and difficulty with tongue movement have subsided completely and he is back to baseline.    *********************** VITALS ******************************************  Vital Signs Last 24 Hrs  T(C): 36.2 (02 May 2020 08:19), Max: 36.6 (01 May 2020 18:24)  T(F): 97.1 (02 May 2020 08:19), Max: 97.8 (01 May 2020 18:24)  HR: 64 (02 May 2020 08:19) (58 - 84)  BP: 133/85 (02 May 2020 08:19) (124/75 - 174/76)  BP(mean): --  RR: 18 (02 May 2020 08:19) (18 - 18)  SpO2: 99% (01 May 2020 21:40) (98% - 100%)    ******************************** PHYSICAL EXAM:**************************************************  GENERAL:  NAD     PSYCH: no agitation     HEENT:   EOMI     NERVOUS SYSTEM:  Alert & Oriented X3     PULMONARY:  patient is breathing comfortably    CARDIOVASCULAR:  RRR, S1 S2 audible      ABDOMEN: Soft, NT, ND     EXTREMITIES:  warm          LABS:                        15.8   8.90  )-----------( 234      ( 02 May 2020 06:35 )             46.3       05-02    142  |  105  |  25<H>  ----------------------------<  105<H>  4.2   |  25  |  0.9    Ca    9.7      02 May 2020 06:35  Mg     2.0     05-01    TPro  6.9  /  Alb  4.4  /  TBili  0.7  /  DBili  x   /  AST  24  /  ALT  24  /  AlkPhos  49  05-01      LIVER FUNCTIONS - ( 01 May 2020 16:10 )  Alb: 4.4 g/dL / Pro: 6.9 g/dL / ALK PHOS: 49 U/L / ALT: 24 U/L / AST: 24 U/L / GGT: x           Lipid Profile (05.02.20 @ 06:35)    Total Cholesterol/HDL Ratio Measurement: 4.7 Ratio    Cholesterol, Serum: 199 mg/dL    Triglycerides, Serum: 465 mg/dL    HDL Cholesterol, Serum: 42: HDL Levels >/= 60 mg/dL are considered beneficial and a "negative" risk  factor.  Effective 08/15/2018: New reference range and interpretive comment. mg/dL    Direct LDL: 112: LDL Cholesterol (mg/dL) --- Interpretive Comment (for adults 18 and over)  Optimal LDL Level may vary based on clinical situation  Below 70                   Ideal for people at very high risk of heart  disease  Below 100                  Ideal for people at risk of heart disease  100 - 129                    [place]  130 - 159                    Borderline high  160 - 189                    High  190 and Above           Very high mg/dL      < from: Xray Chest 1 View- PORTABLE-Urgent (05.01.20 @ 16:04) >  Impression:      Mild left basilar atelectasis.    < end of copied text >          < from: 12 Lead ECG (05.01.20 @ 15:53) >  Diagnosis Line Normal sinus rhythm  Incomplete right bundle branch block  Borderline ECG  Early transition  Nonspecific ST and T wave abnormality    < end of copied text >        < from: CT Angio Head & Neck w/ IV Cont (05.01.20 @ 15:58) >  IMPRESSION:     No CTA evidence of major vascular stenoses or occlusions.    < end of copied text >  < from: CT Brain Stroke Protocol (05.01.20 @ 15:48) >  IMPRESSION:     No acute intracranial hemorrhage or acute large territorial infarct.    MRI of the brain without and with contrast is recommended for further evaluation if not contraindicated in this patient.    < end of copied text >                < from: MR Head No Cont (05.02.20 @ 12:22) >    FINDINGS:   There are multiple foci of hyperintense signal on the B 1000 sequence with corresponding FLAIR hyperintensity as follows: In the left superior temporal gyrus adjacent to the left sylvian fissure, left precentral gyrus along the hand motor knob and precentral gyrus more laterally. There are also punctate foci in the left parietal lobe medially, in particular at the posterior left marginal sulcus. There is no corresponding dark signal on the ADC map. Findings are suggestive of subacute infarcts.    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    Ventricles and sulci are normal in size and configuration for patient's age. Incidental cavum septum lucidum. No extra axial collection. No mass effect  or edema. No evidence of hemorrhage on the gradient echo sequence. The major vessel flow voids are preserved at the skull base.  Cerebellar tonsils are in normal location. The sella is not expanded.    There is mucosal thickening in the maxillary sinuses and ethmoid air cells bilaterally. Mucous retention cysts in the right maxillary sinus. The mastoids are normal in signal. No gross orbital mass. Bone marrow signal is unremarkable.    IMPRESSION:  1. Multiple small foci of signal abnormality in the left frontal and parietal cortex as described above suggestive of subacute infarcts. No brain parenchymal hemorrhage.    2. No intracranial mass effect or midline shift.        < end of copied text >

## 2020-05-02 NOTE — PHYSICAL THERAPY INITIAL EVALUATION ADULT - DISCHARGE DISPOSITION, PT EVAL
Pt is independent with bed mobility, transfers, and ambulation without assistive device. No skilled PT indicated at this time. May follow-up with outpatient services if needed./no skilled PT needs

## 2020-05-02 NOTE — DISCHARGE NOTE PROVIDER - NSDCCPCAREPLAN_GEN_ALL_CORE_FT
PRINCIPAL DISCHARGE DIAGNOSIS  Diagnosis: TIA (transient ischemic attack)  Assessment and Plan of Treatment: You experienced facial numbness and tongue deviation/numbness which we suspect was due to a TIA (transient ischemic attack or a "ministroke").  Neurology has evaluated you and an MRI of your brain was performed.  We plan to treat you with Aspirin 325mg oral once daily and high does Atorvastatin 80mg PO daily taken at bedtime for high cholesterol.  You can obtain aspirin over the counter if  you'd like.  You should follow up with the Neurologist in the office in 1 month, and you also should follow up with a cardiologist in 1-2 weeks so that you can do an Echocardiogram with bubble study as an outpatient, and also be set up for a 30 day heart monitor as an outpatient.  We will list some cardiologists for you to see in the office.      SECONDARY DISCHARGE DIAGNOSES  Diagnosis: Hyperlipemia  Assessment and Plan of Treatment: High cholesterol. Started on Atorvastatin, otherwise known as Lipitor for cholesterol lowering medication therapy. PRINCIPAL DISCHARGE DIAGNOSIS  Diagnosis: Stroke  Assessment and Plan of Treatment: You had a stroke.  Neurology has evaluated you. You will be prescribed Aspirin 325mg PO daily (which is also available over the counter) and atorvastatin 80mg PO daily.  You will need to follow up with a Neurologist in the stroke clinic, and with a cardiologist to have Echo and 30-day cardiac ambulatory monitoring done as an outpatient      SECONDARY DISCHARGE DIAGNOSES  Diagnosis: Hyperlipemia  Assessment and Plan of Treatment: High cholesterol. Started on Atorvastatin, otherwise known as Lipitor for cholesterol lowering medication therapy. PRINCIPAL DISCHARGE DIAGNOSIS  Diagnosis: Stroke  Assessment and Plan of Treatment: You had a stroke.  Neurology has evaluated you. You will be prescribed Apixaban 5mg PO twice a day for blood thinner and   atorvastatin 80mg PO daily.  You will need to follow up with a Neurologist in the stroke clinic, and with the cardiologist to have  outpatient  Atrial flutter ablation set up.      SECONDARY DISCHARGE DIAGNOSES  Diagnosis: Atrial flutter  Assessment and Plan of Treatment: Follow up in office with Cardiology Dr. Randolph.  Take Apixaban and Toprol XL as prescribed.  Notify Dr. Randolph office if any worsening symptoms or palpitations.    Diagnosis: Hyperlipemia  Assessment and Plan of Treatment: High cholesterol. Started on Atorvastatin, otherwise known as Lipitor for cholesterol lowering medication therapy.

## 2020-05-02 NOTE — DISCHARGE NOTE PROVIDER - NSDCFUADDINST_GEN_ALL_CORE_FT
Okay to Return to work/school once cleared by your cardiologist.  You should otherwise stay out of work for the next 1-2 weeks to recover fully and adjust to your new medication regimen.

## 2020-05-02 NOTE — DISCHARGE NOTE PROVIDER - NSDCFUADDAPPT_GEN_ALL_CORE_FT
Please make an appointment with either cardiologist listed in order to obtain outpatient Transthoracic Echocardiogram with bubble study, and also to be set up with 30-day ambulatory telemetry monitoring for TIA work up.  You can call either Dr. Long or Tomasa's offices.  Also please schedule routine follow up with the Neurologist as well. Please call offices of cardiologist and Neurologist for follow up .

## 2020-05-02 NOTE — DISCHARGE NOTE PROVIDER - NSDCQMSTROKERISK_NEU_ALL_CORE
High cholesterol/History of a stroke or TIA History of a stroke or TIA/Atrial fibrillation/High cholesterol

## 2020-05-02 NOTE — DISCHARGE NOTE PROVIDER - HOSPITAL COURSE
64 Man with no PMHx who p/w  facial and tongue numbness which was diagnosed as a TIA    Lipid profile also showed high cholesterol of 199.    Will obtain MRI head  today.  The patient's symptoms  are now totally resolved.      Will defer Echo w bubble study and 30-day telemetry ambulatory monitoring to outpatient w cardiologist    will treat with ASA along with high dose statin    Neurology consulted and has seen and evaluated already.        patient would benefit from 30 day ambulatory telemetry monitoring upon dispo planning and will refer him to cardiologists of his choosing. 64 Man with no PMHx who p/w  facial and tongue numbness which was diagnosed as a TIA    Lipid profile also showed high cholesterol of 199.  There were no events on telemetry when monitored.      MRI  was done and showed multiple foci of infarcts in L MCA territory near the temporal lobe , with old infarct in cerebellum.   The patient's symptoms  are now totally resolved.      Will defer Echo w bubble study and 30-day telemetry ambulatory monitoring to outpatient w cardiologist    will treat with ASA along with high dose statin    Neurology consulted and has seen and evaluated already.        patient would benefit from 30 day ambulatory telemetry monitoring upon dispo planning and will refer him to cardiologists of his choosing. 64 Man with no PMHx who p/w  facial and tongue numbness which was diagnosed as a TIA    Lipid profile also showed high cholesterol of 199.  There were no events on telemetry when monitored.      MRI  was done and showed multiple foci of infarcts in L MCA territory near the temporal lobe , with old infarct in cerebellum.        < from: MR Head No Cont (05.02.20 @ 12:22) >        IMPRESSION:    1. Multiple small foci of signal abnormality in the left frontal and parietal cortex as described above suggestive of subacute infarcts. No brain parenchymal hemorrhage.        2. No intracranial mass effect or midline shift.        < end of copied text >            The patient's symptoms  are now totally resolved.      Will defer Echo w bubble study and 30-day telemetry ambulatory monitoring to outpatient w cardiologist    will treat with ASA along with high dose statin    Neurology consulted and has seen and evaluated already.        patient would benefit from 30 day ambulatory telemetry monitoring upon dispo planning and will refer him to cardiologists of his choosing. 64 M with possible remote history of ?Afib now p/w acute CVA, also found to have new Atrial flutter w RVR.         # Acute CVA    # New Aflutter w RVR    # HLD      Echo results pending     COVID 19 nasopharyngeal PCR was negative.  patient recommended to get COVID antibody testing as outpatient    will treat with Apixaban 5mg PO BID and  high dose statin as per Neurology preference    will use Toprol XL 50mg PO daily for A.flutter    Patient to follow up with Cardiology Dr. Randolph in office and may need to be set up for A.flutter ablation soon outpatient    patient no longer requires outpt ambulatory ECG monitoring given findings of A.flutter in house.        DISPO: Home today        MRI BRAIN    IMPRESSION:    1. Multiple small foci of signal abnormality in the left frontal and parietal cortex as described above suggestive of subacute infarcts. No brain parenchymal hemorrhage.        2. No intracranial mass effect or midline shift.

## 2020-05-02 NOTE — DISCHARGE NOTE PROVIDER - NSDCQMCOGNITION_NEU_ALL_CORE
OFFICE VISIT 4/13/2018    Chief Complaint   Patient presents with   • Follow-up     2 week f/u [facility] s/p cath, leg pain        HISTORY OF PRESENT ILLNESS:    Patient is a 73 year old female with a pmh of coronary artery disease, ischemic cardiomyopathy, hypertension, and dyslipidemia. Patient presents to clinic for follow up post cardiac catheterization. Patient is asymptomatic from the cardiac aspect. He does note an intermittent cough. Limited in mobility secondary to post polio syndrome    Cath 3/22/2018     Impression:  1) totally occluded LCx at the ostium S/P unsuccessful PCI (unable to cross the lesion)  2) severe 80% LAD in-stent restenosis at the ostium  3) severe 70% stenosis of the LAD prior to the first diagonal branch  4) patent ELLSWORTH to LAD     Stress test 3/1/2018     IMPRESSION:     1. Abnormal myocardial perfusion scans at rest and following a regadenoson  injection showing a large sized nontransmural infarct with severe  brooke-infarct ischemia involving the entire lateral wall. Additionally,  there is a small sized area of basal inferior wall infarct with mild  brooke-infarct ischemia.      2. Left ventricular function is moderately to severely reduced post  regadenoson injection.     3. Cardiac Risk Assessment: High due to severely reduced left ventricular  stomach function and the degree of ischemia.     4. Previous study is not available for immediate visual comparison.     5. Findings were discussed with Dr. CATHERINE Gresham on March 1, 2018  approximately at 4:30 PM by Dr. Garcia.     KATHERINE 3/1/2018     SUMMARY:     1. Mildly globally depressed left ventricular systolic function with left  ventricular ejection fraction of 45%.     2. Right ventricle is dilated and has mildly reduced systolic function.     2. Diastolic dysfunction is present.     3. No previous study for comparison.     Echo 1/9/2018     Dilated Ischemic Cardiomyopathy. LVEF:35%  Inferolateral akinesia. Inferior and anterolateral  hypokinesia.  Grade III/IV diastolic dysfunction (restrictive filling pattern), severely elevated filling pressures.  LVEDD has increased from 6 to 6.9 cm since the prior study in 2016. No new RWMA are seen.     CHOLESTEROL (mg/dL)   Date Value   03/22/2018 103     HDL (mg/dL)   Date Value   03/22/2018 19 (L)     CHOL/HDL (no units)   Date Value   03/22/2018 5.4 (H)     TRIGLYCERIDE (mg/dL)   Date Value   03/22/2018 112     CALCULATED LDL (mg/dL)   Date Value   03/22/2018 62        Patient Active Problem List   Diagnosis   • Type II or unspecified type diabetes mellitus without mention of complication, uncontrolled   • CAD (coronary artery disease)   • Morbid obesity (CMS/[facility])   • HTN (hypertension)   • PVD (peripheral vascular disease) (CMS/[facility])   • Lymphedema   • SOFIA (obstructive sleep apnea)   • Hypoalbuminemia   • Cervicalgia   • Tricuspid valve regurgitation   • Hypogonadism male   • Major depressive disorder, recurrent episode, moderate (CMS/[facility])   • Acute systolic CHF (congestive heart failure) (CMS/[facility])   • Hyperlipidemia   • Difficult intubation   • Amputated toe of right foot (CMS/[facility])   • Venous ulcer of right leg (CMS/[facility])   • Corneal transplant status   • Osteomyelitis of toe of right foot (CMS/[facility])   • Microalbuminuria   • History of vitamin D deficiency   • Post-polio syndrome      I have personally reviewed and updated the following EPIC sections: Current medications, Allergies and Problem list    Medications:  Outpatient Prescriptions Marked as Taking for the 4/13/18 encounter (Office Visit) with Eddie Gresham MD   Medication Sig Dispense Refill   • traZODone (DESYREL) 50 MG tablet TAKE 2 TABLETS BY MOUTH AT NIGHT AS NEEDED FOR SLEEP 60 tablet 2   • pantoprazole (PROTONIX) 40 MG tablet TAKE 1 TABLET DAILY 90 tablet 2   • insulin subQ pump Inject into the skin continuous. Humulin R U 500 - Basal rate plus 2-6 Unit bolus with meals     • blood glucose test strip Allen contour test strips Test blood  sugar 4 times daily. Dx:E11.65 . Meter:Contour Next Link. [employer] 670g pump 400 each 1   • niacin (NIASPAN) 1000 MG CR tablet Take 1 tablet by mouth daily. 90 tablet 0   • furosemide (LASIX) 80 MG tablet Take 1 tablet by mouth daily. (Patient taking differently: Take 80 mg by mouth 2 times daily. ) 60 tablet 11   • lovastatin (MEVACOR) 40 MG tablet Take 2 tablets by mouth nightly. 180 tablet 2   • albuterol 108 (90 Base) MCG/ACT inhaler Inhale 2 puffs into the lungs four times daily. 1 Inhaler 1   • aluminum chloride (DRYSOL) 20 % topical solution Apply at bedtime, wash off in morning, maintenance once or twice a week 1 Bottle 5   • losartan (COZAAR) 25 MG tablet Take 0.5 tablets by mouth daily. 45 tablet 1   • hyoscyamine (HYOMAX SR) 0.375 MG 12 hr tablet TAKE 1 TABLET TWICE A DAY. 180 tablet 3   • buPROPion (WELLBUTRIN XL) 150 MG 24 hr tablet TAKE 1 TABLET DAILY 90 tablet 3   • metoPROLOL (TOPROL-XL) 50 MG 24 hr tablet TAKE 1 TABLET DAILY 90 tablet 3   • clopidogrel (PLAVIX) 75 MG tablet TAKE 1 TABLET DAILY 90 tablet 2   • sertraline (ZOLOFT) 50 MG tablet Take 1 tablet by mouth daily. 90 tablet 3   • prednisoLONE acetate (PRED FORTE, OMNIPRED) 1 % ophthalmic suspension Place 1 drop into right eye 4 times daily.      • diphenoxylate-atropine (LOMOTIL) 2.5-0.025 MG tablet Take 1 tablet by mouth 4 times daily as needed for Diarrhea. 30 tablet 2   • Cholecalciferol (VITAMIN D) 2000 UNITS tablet Take 2,000 Units by mouth daily.     • aspirin 81 MG tablet Take 81 mg by mouth nightly.          PHYSICAL EXAMINATION:  Vitals:    04/13/18 0836   BP: 118/58   Pulse: 78       Review of Systems   Cardiovascular: Negative.    Respiratory: Negative.    Skin: Negative.        CONSTITUTION: Well-developed, well-nourished.  PSYCHIATRIC/NEUROLOGIC: Comfortable and in no apparent distress. Alert and oriented x 3. In a good mood.  SKIN: Soft, warm, and dry, without rashes or bruises.    LUNGS: Respiratory effort is unlabored. Lungs  are clear without wheezing or crackles.  CARDIAC EXAM: The PMI is non-displaced. Auscultation reveals a normal S1, normal S2. No S3 or S4. No murmurs, clicks, or pericardial friction rub.   EXTREMITIES: Without clubbing, cyanosis or edema. Femoral and pedal pulses intact.     ASSESSMENT/PLAN:    Coronary artery disease  Recent cardiac cath notes that rodriguez to lad is patent, severe instent stenosis of lad stent however distal LAD supplied by lima, and lcx is totally occluded however unable to cross lesion during cath. Discussed in detail proceeding with planned intervention on CX via PCI or discussing surgery with Dr. Yi. Patient and wife will discuss options. Patient is asymptomatic.  Continue on asa, plavix, lovastatin, and metoprolol.     Ischemic cardiomyopathy  Last echocardiogram noted ejection fraction of 35% and MUGA noted 38%. Patient does not have symptoms at this time. Continue on lasix, losartan, and metoprolol.     Peripheral artery disease  Status post right fourth toe amputation and right femoral to posterior tibial bypass. Bypass was occluded according to last angio 6/2017. Continue on asa, plavix, and statin.     Hypertension  Blood pressure well controlled on assessment. Will control current regimen.     Dyslipidemia  CALCULATED LDL (mg/dL)   Date Value   03/22/2018 62   continue on niacin, lovastatin.     Diabetes  Hemoglobin A1C (%)   Date Value   03/22/2018 6.7 (H)       I will see the patient back in 6 Weeks or sooner if problems arise.    On 4/13/2018, IDemetra RN scribed the services personally performed by Eddie Gresham MD.  The documentation recorded by the scribe accurately and completely reflects the service(s) I personally performed and the decisions made by me.          No difficulties

## 2020-05-02 NOTE — PHYSICAL THERAPY INITIAL EVALUATION ADULT - GENERAL OBSERVATIONS, REHAB EVAL
10:30-11:00. Chart reviewed; confirmed with RN to see the pt for PT. Pt ready for PT. Pt received standing in the room without complain of pain and in NAD.  + hep lock L UE. Agreeable for PT evaluation.

## 2020-05-03 LAB
D DIMER BLD IA.RAPID-MCNC: 101 NG/ML DDU — SIGNIFICANT CHANGE UP (ref 0–230)
SARS-COV-2 RNA SPEC QL NAA+PROBE: SIGNIFICANT CHANGE UP

## 2020-05-03 PROCEDURE — 71045 X-RAY EXAM CHEST 1 VIEW: CPT | Mod: 26

## 2020-05-03 PROCEDURE — 99232 SBSQ HOSP IP/OBS MODERATE 35: CPT

## 2020-05-03 PROCEDURE — 99233 SBSQ HOSP IP/OBS HIGH 50: CPT

## 2020-05-03 RX ORDER — RIVAROXABAN 15 MG-20MG
20 KIT ORAL
Refills: 0 | Status: DISCONTINUED | OUTPATIENT
Start: 2020-05-03 | End: 2020-05-03

## 2020-05-03 RX ORDER — RIVAROXABAN 15 MG-20MG
1 KIT ORAL
Qty: 30 | Refills: 2
Start: 2020-05-03 | End: 2020-07-31

## 2020-05-03 RX ORDER — METOPROLOL TARTRATE 50 MG
25 TABLET ORAL DAILY
Refills: 0 | Status: DISCONTINUED | OUTPATIENT
Start: 2020-05-03 | End: 2020-05-04

## 2020-05-03 RX ORDER — APIXABAN 2.5 MG/1
5 TABLET, FILM COATED ORAL EVERY 12 HOURS
Refills: 0 | Status: DISCONTINUED | OUTPATIENT
Start: 2020-05-03 | End: 2020-05-04

## 2020-05-03 RX ORDER — METOPROLOL TARTRATE 50 MG
25 TABLET ORAL ONCE
Refills: 0 | Status: COMPLETED | OUTPATIENT
Start: 2020-05-03 | End: 2020-05-03

## 2020-05-03 RX ADMIN — ATORVASTATIN CALCIUM 80 MILLIGRAM(S): 80 TABLET, FILM COATED ORAL at 21:41

## 2020-05-03 RX ADMIN — Medication 25 MILLIGRAM(S): at 18:57

## 2020-05-03 RX ADMIN — Medication 25 MILLIGRAM(S): at 11:27

## 2020-05-03 RX ADMIN — APIXABAN 5 MILLIGRAM(S): 2.5 TABLET, FILM COATED ORAL at 18:57

## 2020-05-03 NOTE — PROGRESS NOTE ADULT - ASSESSMENT
64 M with possible remote history of ?Afib now p/w acute CVA, also found to have new Atrial flutter w RVR.     # Acute CVA  # New Aflutter w RVR  # HLD    Echo  cont tele monitoring today  Cardiology has started patient on metoprolol XL and will monitor HR another 24hrs to ensure adequate rate control  COVID 19 nasopharyngeal PCR sent , awaiting results  will treat with Apixaban 5mg PO BID and  high dose statin as per Neurology preference    patient no longer requires outpt ambulatory ECG monitoring given findings of A.flutter in house.    DISPO:  will tentatively plan for Dispo tomrw after echo done if HR controlled 64 M with possible remote history of ?Afib now p/w acute CVA, also found to have new Atrial flutter w RVR.     # Acute CVA  # New Aflutter w RVR  # HLD    Echo  cont tele monitoring today  Cardiology has started patient on metoprolol XL and will monitor HR another 24hrs to ensure adequate rate control  COVID 19 nasopharyngeal PCR sent , awaiting results  will treat with Apixaban 5mg PO BID and  high dose statin as per Neurology preference    patient no longer requires outpt ambulatory ECG monitoring given findings of A.flutter in house.    DISPO:  Family contact is son  Lacho # [phone] who was updated today.  will tentatively plan for Dispo tomrw after echo done if HR controlled

## 2020-05-03 NOTE — PROGRESS NOTE ADULT - ASSESSMENT
63 yo M s/ [facility] p/w acute L facial weakness/numbness and dysarthria found w/ punctate infarcts in the L MCA distribution.    Recommendations:  - Echo  - continue cardiac telemetry 65 yo M s/ [facility] p/w acute L facial weakness/numbness and dysarthria found w/ punctate infarcts in the L MCA distribution found w/ Atrial flutter likely cardioembolic.      Recommendations:  - Echo  - continue cardiac telemetry  - recommend Eliquis 5 mg BID if ok w/ cardiology  - rate control as per cardiology-  - f/u in stroke clinic w/n 2 weeks after discharge ([phone])

## 2020-05-03 NOTE — CONSULT NOTE ADULT - SUBJECTIVE AND OBJECTIVE BOX
CARDIOLOGY CONSULT NOTE     CHIEF COMPLAINT/REASON FOR CONSULT:    HPI:  65 y/o male with no pmh presenting for evaluation of right facial weakness/numbness and confusion that started this afternoon.  Patient explains that while eating lunch today he became unable to speak and started to spill his drink while drinking his tea.  He explains that this was unwitnessed.  During this time he felt no weakness in his arms or legs and was able to bring himself to his bed.  At his bed he took a nap.  The patient also explains that prior to this episode he had another episode earlier before lunch where he went to the wrong supermarket when going to  his brother.  He explains he was supposed to go to [employer] and ended up at another store.  No  chest pain, no headache, mo numbness, no tingling, no abd pain, no bowel abnormalities reported by the patient. (01 May 2020 18:27)      PAST MEDICAL & SURGICAL HISTORY:  No pertinent past medical history  No significant past surgical history      Cardiac Risks:   [ ]HTN, [ ] DM, [ ] Smoking, [ ] FH,  [ x] Lipids        MEDICATIONS:  MEDICATIONS  (STANDING):  atorvastatin 80 milliGRAM(s) Oral at bedtime  rivaroxaban 20 milliGRAM(s) Oral with dinner      FAMILY HISTORY:  No pertinent family history in first degree relatives      SOCIAL HISTORY:      [ ] Marital status    Allergies    No Known Allergies    Intolerances    	    REVIEW OF SYSTEMS:  CONSTITUTIONAL: No fever, weight loss, or fatigue  EYES: No eye pain, visual disturbances, or discharge  ENMT:  No difficulty hearing, tinnitus, vertigo; No sinus or throat pain  NECK: No pain or stiffness  RESPIRATORY: No cough, wheezing, chills or hemoptysis; No Shortness of Breath  CARDIOVASCULAR: No chest pain, palpitations, passing out, dizziness, or leg swelling  GASTROINTESTINAL: No abdominal or epigastric pain. No nausea, vomiting, or hematemesis; No diarrhea or constipation. No melena or hematochezia.  GENITOURINARY: No dysuria, frequency, hematuria, or incontinence  NEUROLOGICAL: No headaches, memory loss, loss of strength, numbness, or tremors  SKIN: No itching, burning, rashes, or lesions   	      PHYSICAL EXAM:  T(C): 36.2 (05-03-20 @ 05:00), Max: 36.6 (05-02-20 @ 19:10)  HR: 73 (05-03-20 @ 05:00) (57 - 73)  BP: 137/81 (05-03-20 @ 05:00) (137/81 - 152/74)  RR: 16 (05-03-20 @ 05:00) (16 - 18)  SpO2: --  Wt(kg): --  I&O's Summary      Appearance: Normal	  Psychiatry: A & O x 3, Mood & affect appropriate  HEENT:   Normal oral mucosa, PERRL, EOMI	  Lymphatic: No lymphadenopathy  Cardiovascular: Normal S1 S2,RRR, No JVD, No murmurs  Respiratory: Lungs clear to auscultation	  Gastrointestinal:  Soft, Non-tender, + BS	  Skin: No rashes, No ecchymoses, No cyanosis	  Neurologic: Non-focal  Extremities: Normal range of motion, No clubbing, cyanosis or edema  Vascular: Peripheral pulses palpable 2+ bilaterally      ECG:  	< from: 12 Lead ECG (05.02.20 @ 22:38) >  Diagnosis Line Atrial flutter with variable A-V block  Incomplete right bundle branch block  Abnormal ECG    Confirmed by SAVANA CROWE, YONY ([id number]) on 5/3/2020 7:38:20 AM    < end of copied text >      	  LABS:	 	    CARDIAC MARKERS:                                    15.8   8.90  )-----------( 234      ( 02 May 2020 06:35 )             46.3     05-02    140  |  105  |  18  ----------------------------<  131<H>  4.1   |  25  |  1.2    Ca    9.6      02 May 2020 23:00  Mg     1.9     05-02    TPro  6.9  /  Alb  4.4  /  TBili  0.7  /  DBili  x   /  AST  24  /  ALT  24  /  AlkPhos  49  05-01    PT/INR - ( 02 May 2020 06:35 )   PT: 12.60 sec;   INR: 1.10 ratio         PTT - ( 01 May 2020 16:10 )  PTT:34.0 sec

## 2020-05-03 NOTE — CONSULT NOTE ADULT - ASSESSMENT
Patient with hx aflutter. On xarelto, He had cva. Rate at times increased. Add beta for better rate control. Ask patient if he missed doses of xarelto. If no consider switch coumadin or Eliquis Patient with hx aflutter. In flutter now Rate at times increased. Add beta for better rate control. Patient was not taking meds at home. Put on xarelto for life. Out patient echo. Reviewed xarelto with patient.

## 2020-05-03 NOTE — PROGRESS NOTE ADULT - SUBJECTIVE AND OBJECTIVE BOX
OVERNIGHT:  Cardiology Dr. Randolph noticed that patient was in rapid atrial flutter on telemetry monitoring this AM beginning around 9:30am today.   HR ranged 90s-110s.      His family  was requesting COVID testing because a sick contact of his was his wife recently.  The patient has had only fatigue/malaise for past few weeks.      SUBJECTIVE:    Patient symptoms have been resolved.  He denies any palpitations       *********************** VITALS ******************************************  Vital Signs Last 24 Hrs  T(C): 36.2 (03 May 2020 05:00), Max: 36.6 (02 May 2020 19:10)  T(F): 97.2 (03 May 2020 05:00), Max: 97.8 (02 May 2020 19:10)  HR: 73 (03 May 2020 05:00) (57 - 73)  BP: 137/81 (03 May 2020 05:00) (137/81 - 152/74)  BP(mean): --  RR: 16 (03 May 2020 05:00) (16 - 18)  SpO2: --    ******************************** PHYSICAL EXAM:**************************************************  GENERAL:  NAD     PSYCH: no agitation     HEENT:   EOMI     NERVOUS SYSTEM:  Alert & Oriented X3     PULMONARY:  patient is breathing comfortably on room air.    CARDIOVASCULAR: irreg rate & rhythm, tachycardic    ABDOMEN: Soft, NT, ND     EXTREMITIES:  warm          LABS:                        15.8   8.90  )-----------( 234      ( 02 May 2020 06:35 )             46.3       05-02    142  |  105  |  25<H>  ----------------------------<  105<H>  4.2   |  25  |  0.9    Ca    9.7      02 May 2020 06:35  Mg     2.0     05-01    TPro  6.9  /  Alb  4.4  /  TBili  0.7  /  DBili  x   /  AST  24  /  ALT  24  /  AlkPhos  49  05-01      LIVER FUNCTIONS - ( 01 May 2020 16:10 )  Alb: 4.4 g/dL / Pro: 6.9 g/dL / ALK PHOS: 49 U/L / ALT: 24 U/L / AST: 24 U/L / GGT: x           Lipid Profile (05.02.20 @ 06:35)    Total Cholesterol/HDL Ratio Measurement: 4.7 Ratio    Cholesterol, Serum: 199 mg/dL    Triglycerides, Serum: 465 mg/dL    HDL Cholesterol, Serum: 42: HDL Levels >/= 60 mg/dL are considered beneficial and a "negative" risk  factor.  Effective 08/15/2018: New reference range and interpretive comment. mg/dL    Direct LDL: 112: LDL Cholesterol (mg/dL) --- Interpretive Comment (for adults 18 and over)  Optimal LDL Level may vary based on clinical situation  Below 70                   Ideal for people at very high risk of heart  disease  Below 100                  Ideal for people at risk of heart disease  100 - 129                    [place]  130 - 159                    Borderline high  160 - 189                    High  190 and Above           Very high mg/dL      < from: Xray Chest 1 View- PORTABLE-Urgent (05.01.20 @ 16:04) >  Impression:      Mild left basilar atelectasis.    < end of copied text >          < from: 12 Lead ECG (05.01.20 @ 15:53) >  Diagnosis Line Normal sinus rhythm  Incomplete right bundle branch block  Borderline ECG  Early transition  Nonspecific ST and T wave abnormality    < end of copied text >        < from: CT Angio Head & Neck w/ IV Cont (05.01.20 @ 15:58) >  IMPRESSION:     No CTA evidence of major vascular stenoses or occlusions.    < end of copied text >  < from: CT Brain Stroke Protocol (05.01.20 @ 15:48) >  IMPRESSION:     No acute intracranial hemorrhage or acute large territorial infarct.    MRI of the brain without and with contrast is recommended for further evaluation if not contraindicated in this patient.    < end of copied text >                < from: MR Head No Cont (05.02.20 @ 12:22) >    FINDINGS:   There are multiple foci of hyperintense signal on the B 1000 sequence with corresponding FLAIR hyperintensity as follows: In the left superior temporal gyrus adjacent to the left sylvian fissure, left precentral gyrus along the hand motor knob and precentral gyrus more laterally. There are also punctate foci in the left parietal lobe medially, in particular at the posterior left marginal sulcus. There is no corresponding dark signal on the ADC map. Findings are suggestive of subacute infarcts.    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    Ventricles and sulci are normal in size and configuration for patient's age. Incidental cavum septum lucidum. No extra axial collection. No mass effect  or edema. No evidence of hemorrhage on the gradient echo sequence. The major vessel flow voids are preserved at the skull base.  Cerebellar tonsils are in normal location. The sella is not expanded.    There is mucosal thickening in the maxillary sinuses and ethmoid air cells bilaterally. Mucous retention cysts in the right maxillary sinus. The mastoids are normal in signal. No gross orbital mass. Bone marrow signal is unremarkable.    IMPRESSION:  1. Multiple small foci of signal abnormality in the left frontal and parietal cortex as described above suggestive of subacute infarcts. No brain parenchymal hemorrhage.    2. No intracranial mass effect or midline shift.        < end of copied text >

## 2020-05-03 NOTE — PROGRESS NOTE ADULT - SUBJECTIVE AND OBJECTIVE BOX
Neurology Progress Note    Interval History:      HPI:  63 y/o male with no pmh presenting for evaluation of right facial weakness/numbness and confusion that started this afternoon.  Patient explains that while eating lunch today he became unable to speak and started to spill his drink while drinking his tea.  He explains that this was unwitnessed.  During this time he felt no weakness in his arms or legs and was able to bring himself to his bed.  At his bed he took a nap.  The patient also explains that prior to this episode he had another episode earlier before lunch where he went to the wrong supermarket when going to  his brother.  He explains he was supposed to go to [employer] and ended up at another store.  No  chest pain, no headache, mo numbness, no tingling, no abd pain, no bowel abnormalities reported by the patient. (01 May 2020 18:27)    PAST MEDICAL & SURGICAL HISTORY:  No pertinent past medical history  No significant past surgical history    Medications:  atorvastatin 80 milliGRAM(s) Oral at bedtime  rivaroxaban 20 milliGRAM(s) Oral with dinner    Vital Signs Last 24 Hrs  T(C): 36.2 (03 May 2020 05:00), Max: 36.6 (02 May 2020 19:10)  T(F): 97.2 (03 May 2020 05:00), Max: 97.8 (02 May 2020 19:10)  HR: 73 (03 May 2020 05:00) (57 - 73)  BP: 137/81 (03 May 2020 05:00) (137/81 - 152/74)  BP(mean): --  RR: 16 (03 May 2020 05:00) (16 - 18)  SpO2: --    Neurological Exam:   Mental status: Awake, alert and oriented x3.  Recent and remote memory intact.  Naming, repetition and comprehension intact.  Attention/concentration intact.  No dysarthria, no aphasia.  Fund of knowledge appropriate.    Cranial nerves: Pupils equally round and reactive to light, visual fields full, no nystagmus, extraocular muscles intact, V1 through V3 intact bilaterally and symmetric, face symmetric, hearing intact to finger rub, palate elevation symmetric, tongue was midline.  Motor:  MRC grading 5/5 b/l UE/LE.   strength 5/5.  Normal tone and bulk.  No abnormal movements.    Sensation: Intact to light touch, proprioception, and pinprick.   Coordination: No dysmetria on finger-to-nose and heel-to-shin.  No dysdiadokinesia.  Reflexes: 2+ in bilateral UE/LE, downgoing toes bilaterally. (-) López.  Gait: Narrow and steady. No ataxia.  Romberg negative    Labs:  CBC Full  -  ( 02 May 2020 06:35 )  WBC Count : 8.90 K/uL  RBC Count : 5.09 M/uL  Hemoglobin : 15.8 g/dL  Hematocrit : 46.3 %  Platelet Count - Automated : 234 K/uL  Mean Cell Volume : 91.0 fL  Mean Cell Hemoglobin : 31.0 pg  Mean Cell Hemoglobin Concentration : 34.1 g/dL  Auto Neutrophil # : 5.42 K/uL  Auto Lymphocyte # : 1.91 K/uL  Auto Monocyte # : 0.87 K/uL  Auto Eosinophil # : 0.51 K/uL  Auto Basophil # : 0.11 K/uL  Auto Neutrophil % : 60.9 %  Auto Lymphocyte % : 21.5 %  Auto Monocyte % : 9.8 %  Auto Eosinophil % : 5.7 %  Auto Basophil % : 1.2 %    05-02    140  |  105  |  18  ----------------------------<  131<H>  4.1   |  25  |  1.2    Ca    9.6      02 May 2020 23:00  Mg     1.9     05-02    TPro  6.9  /  Alb  4.4  /  TBili  0.7  /  DBili  x   /  AST  24  /  ALT  24  /  AlkPhos  49  05-01    LIVER FUNCTIONS - ( 01 May 2020 16:10 )  Alb: 4.4 g/dL / Pro: 6.9 g/dL / ALK PHOS: 49 U/L / ALT: 24 U/L / AST: 24 U/L / GGT: x           PT/INR - ( 02 May 2020 06:35 )   PT: 12.60 sec;   INR: 1.10 ratio         PTT - ( 01 May 2020 16:10 )  PTT:34.0 sec    < from: MR Head No Cont (05.02.20 @ 12:22) >  IMPRESSION:  1. Multiple small foci of signal abnormality in the left frontal and parietal cortex as described above suggestive of subacute infarcts. No brain parenchymal hemorrhage.    2. No intracranial mass effect or midline shift.        ***Please see the addendum at the top of this report. It may contain additional important information or changes.****          SOHAIL ROBB M.D., ATTENDING RADIOLOGIST  This document has been electronically signed. May  2 2020 12:47PM  Addend:  SOHAIL ROBB M.D., ATTENDING RADIOLOGIST  This addendum was electronically signed on: May  2 2020  1:13PM.        < end of copied text > Neurology Progress Note    Interval History:  Pt currently symptomatic back to baseline.  Found with Aflutter on telemetry with HR in 120s.  Per pt had "heart issue" in 2007 requiring 1 week hospitalization and took medications for several months after that but has not taken any additional heart medications since then.  Was in good cardiovascular health the last few years still plays soccer with his family and friends until pandemic.  recently has not felt as energetic due to self-isolation during pandemic.      HPI:  63 y/o male with no pmh presenting for evaluation of right facial weakness/numbness and confusion that started this afternoon.  Patient explains that while eating lunch today he became unable to speak and started to spill his drink while drinking his tea.  He explains that this was unwitnessed.  During this time he felt no weakness in his arms or legs and was able to bring himself to his bed.  At his bed he took a nap.  The patient also explains that prior to this episode he had another episode earlier before lunch where he went to the wrong supermarket when going to  his brother.  He explains he was supposed to go to [employer] and ended up at another store.  No  chest pain, no headache, mo numbness, no tingling, no abd pain, no bowel abnormalities reported by the patient. (01 May 2020 18:27)    PAST MEDICAL & SURGICAL HISTORY:  No pertinent past medical history  No significant past surgical history    Medications:  atorvastatin 80 milliGRAM(s) Oral at bedtime  rivaroxaban 20 milliGRAM(s) Oral with dinner    Vital Signs Last 24 Hrs  T(C): 36.2 (03 May 2020 05:00), Max: 36.6 (02 May 2020 19:10)  T(F): 97.2 (03 May 2020 05:00), Max: 97.8 (02 May 2020 19:10)  HR: 73 (03 May 2020 05:00) (57 - 73)  BP: 137/81 (03 May 2020 05:00) (137/81 - 152/74)  BP(mean): --  RR: 16 (03 May 2020 05:00) (16 - 18)  SpO2: --    Neurological Exam:   Mental status: Awake, alert and oriented x3.  Recent and remote memory intact.  Naming, repetition and comprehension intact.  Attention/concentration intact.  No dysarthria, no aphasia.  Fund of knowledge appropriate.    Cranial nerves: Pupils equally round and reactive to light, visual fields full, no nystagmus, extraocular muscles intact, V1 through V3 intact bilaterally and symmetric, face symmetric, hearing intact to finger rub, palate elevation symmetric, tongue was midline.  Motor:  MRC grading 5/5 b/l UE/LE.   strength 5/5.  Normal tone and bulk.  No abnormal movements.    Sensation: Intact to light touch, proprioception, and pinprick.   Coordination: No dysmetria on finger-to-nose and heel-to-shin.  No dysdiadokinesia.  Reflexes: 2+ in bilateral UE/LE, downgoing toes bilaterally. (-) López.  Gait: Narrow and steady. No ataxia.  Romberg negative    Labs:  CBC Full  -  ( 02 May 2020 06:35 )  WBC Count : 8.90 K/uL  RBC Count : 5.09 M/uL  Hemoglobin : 15.8 g/dL  Hematocrit : 46.3 %  Platelet Count - Automated : 234 K/uL  Mean Cell Volume : 91.0 fL  Mean Cell Hemoglobin : 31.0 pg  Mean Cell Hemoglobin Concentration : 34.1 g/dL  Auto Neutrophil # : 5.42 K/uL  Auto Lymphocyte # : 1.91 K/uL  Auto Monocyte # : 0.87 K/uL  Auto Eosinophil # : 0.51 K/uL  Auto Basophil # : 0.11 K/uL  Auto Neutrophil % : 60.9 %  Auto Lymphocyte % : 21.5 %  Auto Monocyte % : 9.8 %  Auto Eosinophil % : 5.7 %  Auto Basophil % : 1.2 %    05-02    140  |  105  |  18  ----------------------------<  131<H>  4.1   |  25  |  1.2    Ca    9.6      02 May 2020 23:00  Mg     1.9     05-02    TPro  6.9  /  Alb  4.4  /  TBili  0.7  /  DBili  x   /  AST  24  /  ALT  24  /  AlkPhos  49  05-01    LIVER FUNCTIONS - ( 01 May 2020 16:10 )  Alb: 4.4 g/dL / Pro: 6.9 g/dL / ALK PHOS: 49 U/L / ALT: 24 U/L / AST: 24 U/L / GGT: x           PT/INR - ( 02 May 2020 06:35 )   PT: 12.60 sec;   INR: 1.10 ratio         PTT - ( 01 May 2020 16:10 )  PTT:34.0 sec    < from: MR Head No Cont (05.02.20 @ 12:22) >  IMPRESSION:  1. Multiple small foci of signal abnormality in the left frontal and parietal cortex as described above suggestive of subacute infarcts. No brain parenchymal hemorrhage.    2. No intracranial mass effect or midline shift.        ***Please see the addendum at the top of this report. It may contain additional important information or changes.****          SOHAIL ROBB M.D., ATTENDING RADIOLOGIST  This document has been electronically signed. May  2 2020 12:47PM  Addend:  SOHAIL ROBB M.D., ATTENDING RADIOLOGIST  This addendum was electronically signed on: May  2 2020  1:13PM.        < end of copied text >

## 2020-05-04 ENCOUNTER — TRANSCRIPTION ENCOUNTER (OUTPATIENT)
Age: 65
End: 2020-05-04

## 2020-05-04 VITALS
HEART RATE: 69 BPM | DIASTOLIC BLOOD PRESSURE: 85 MMHG | RESPIRATION RATE: 18 BRPM | SYSTOLIC BLOOD PRESSURE: 138 MMHG | TEMPERATURE: 99 F

## 2020-05-04 LAB
FERRITIN SERPL-MCNC: 341 NG/ML — SIGNIFICANT CHANGE UP (ref 30–400)
GLUCOSE BLDC GLUCOMTR-MCNC: 156 MG/DL — HIGH (ref 70–99)

## 2020-05-04 PROCEDURE — 99238 HOSP IP/OBS DSCHRG MGMT 30/<: CPT

## 2020-05-04 RX ORDER — APIXABAN 2.5 MG/1
1 TABLET, FILM COATED ORAL
Qty: 60 | Refills: 2
Start: 2020-05-04 | End: 2020-08-01

## 2020-05-04 RX ORDER — METOPROLOL TARTRATE 50 MG
50 TABLET ORAL DAILY
Refills: 0 | Status: DISCONTINUED | OUTPATIENT
Start: 2020-05-04 | End: 2020-05-04

## 2020-05-04 RX ORDER — METOPROLOL TARTRATE 50 MG
1 TABLET ORAL
Qty: 30 | Refills: 2
Start: 2020-05-04 | End: 2020-08-01

## 2020-05-04 RX ADMIN — APIXABAN 5 MILLIGRAM(S): 2.5 TABLET, FILM COATED ORAL at 05:49

## 2020-05-04 RX ADMIN — Medication 25 MILLIGRAM(S): at 05:49

## 2020-05-04 RX ADMIN — Medication 50 MILLIGRAM(S): at 11:07

## 2020-05-04 NOTE — DISCHARGE NOTE NURSING/CASE MANAGEMENT/SOCIAL WORK - PATIENT PORTAL LINK FT
You can access the FollowMyHealth Patient Portal offered by [facility] by registering at the following website: [email or web address]. By joining [employer]’s FollowMyHealth portal, you will also be able to view your health information using other applications (apps) compatible with our system.

## 2020-05-04 NOTE — DISCHARGE NOTE NURSING/CASE MANAGEMENT/SOCIAL WORK - NSDCPEPTSTRK_GEN_ALL_CORE
Stroke warning signs and symptoms/Signs and symptoms of stroke/Call 911 for stroke/Prescribed medications/Risk factors for stroke/Stroke education booklet/Stroke support groups for patients, families, and friends/Need for follow up after discharge

## 2020-05-04 NOTE — PROGRESS NOTE ADULT - ASSESSMENT
64 M with possible remote history of ?Afib now p/w acute CVA, also found to have new Atrial flutter w RVR.     # Acute CVA  # New Aflutter w RVR  # HLD    Echo results pending   COVID 19 nasopharyngeal PCR was negative.  patient recommended to get COVID antibody testing as outpatient  will treat with Apixaban 5mg PO BID and  high dose statin as per Neurology preference  will use Toprol XL 50mg PO daily for A.flutter  Patient to follow up with Cardiology Dr. Randolph in office and may need to be set up for A.flutter ablation soon outpatient  patient no longer requires outpt ambulatory ECG monitoring given findings of A.flutter in house.    DISPO: Home today

## 2020-05-04 NOTE — PROGRESS NOTE ADULT - ASSESSMENT
Patient in aflutter. Rate this am appears controlled. On AC. Ambulate. Can consider in 4-6 weeks outpatient ablation

## 2020-05-04 NOTE — SWALLOW BEDSIDE ASSESSMENT ADULT - SLP PERTINENT HISTORY OF CURRENT PROBLEM
Pt adm w/ R facial numbness/weakness & lingual numbness, confusion; CTH & CTA negative.  MRI: multiple small subacute punctate infarcts in L frontal parietal cortex (L MCA distribution).  Pt noted to be in Aflutter, being managed by cardiology.  Pt returned to baseline shortly after admission and reports no noted current difficulty w/ speech, language, cognition, or swallowing.

## 2020-05-04 NOTE — PROGRESS NOTE ADULT - SUBJECTIVE AND OBJECTIVE BOX
OVERNIGHT:   patient still in A.flutter with HR fluctuating from 40s to 150s at times.  Toprol XL increased to 50mg PO daily for now.       SUBJECTIVE:    Patient symptoms have been resolved.  He denies any palpitations       *********************** VITALS ******************************************  Vital Signs Last 24 Hrs  T(C): 37 (04 May 2020 05:13), Max: 37 (04 May 2020 05:13)  T(F): 98.6 (04 May 2020 05:13), Max: 98.6 (04 May 2020 05:13)  HR: 69 (04 May 2020 05:13) (69 - 75)  BP: 138/85 (04 May 2020 05:13) (121/75 - 138/85)  BP(mean): --  RR: 18 (04 May 2020 05:13) (16 - 18)  SpO2: --    ******************************** PHYSICAL EXAM:**************************************************  GENERAL:  NAD     PSYCH: no agitation     HEENT:   EOMI     NERVOUS SYSTEM:  Alert & Oriented X3     PULMONARY:  patient is breathing comfortably on room air.    CARDIOVASCULAR: irreg rate & rhythm, tachycardic    ABDOMEN: Soft, NT, ND     EXTREMITIES:  warm          LABS:                        15.8   8.90  )-----------( 234      ( 02 May 2020 06:35 )             46.3       05-02    142  |  105  |  25<H>  ----------------------------<  105<H>  4.2   |  25  |  0.9    Ca    9.7      02 May 2020 06:35  Mg     2.0     05-01    TPro  6.9  /  Alb  4.4  /  TBili  0.7  /  DBili  x   /  AST  24  /  ALT  24  /  AlkPhos  49  05-01      LIVER FUNCTIONS - ( 01 May 2020 16:10 )  Alb: 4.4 g/dL / Pro: 6.9 g/dL / ALK PHOS: 49 U/L / ALT: 24 U/L / AST: 24 U/L / GGT: x           Lipid Profile (05.02.20 @ 06:35)    Total Cholesterol/HDL Ratio Measurement: 4.7 Ratio    Cholesterol, Serum: 199 mg/dL    Triglycerides, Serum: 465 mg/dL    HDL Cholesterol, Serum: 42: HDL Levels >/= 60 mg/dL are considered beneficial and a "negative" risk  factor.  Effective 08/15/2018: New reference range and interpretive comment. mg/dL    Direct LDL: 112: LDL Cholesterol (mg/dL) --- Interpretive Comment (for adults 18 and over)  Optimal LDL Level may vary based on clinical situation  Below 70                   Ideal for people at very high risk of heart  disease  Below 100                  Ideal for people at risk of heart disease  100 - 129                    [place]  130 - 159                    Borderline high  160 - 189                    High  190 and Above           Very high mg/dL      < from: Xray Chest 1 View- PORTABLE-Urgent (05.01.20 @ 16:04) >  Impression:      Mild left basilar atelectasis.    < end of copied text >          < from: 12 Lead ECG (05.01.20 @ 15:53) >  Diagnosis Line Normal sinus rhythm  Incomplete right bundle branch block  Borderline ECG  Early transition  Nonspecific ST and T wave abnormality    < end of copied text >        < from: CT Angio Head & Neck w/ IV Cont (05.01.20 @ 15:58) >  IMPRESSION:     No CTA evidence of major vascular stenoses or occlusions.    < end of copied text >  < from: CT Brain Stroke Protocol (05.01.20 @ 15:48) >  IMPRESSION:     No acute intracranial hemorrhage or acute large territorial infarct.    MRI of the brain without and with contrast is recommended for further evaluation if not contraindicated in this patient.    < end of copied text >                < from: MR Head No Cont (05.02.20 @ 12:22) >    FINDINGS:   There are multiple foci of hyperintense signal on the B 1000 sequence with corresponding FLAIR hyperintensity as follows: In the left superior temporal gyrus adjacent to the left sylvian fissure, left precentral gyrus along the hand motor knob and precentral gyrus more laterally. There are also punctate foci in the left parietal lobe medially, in particular at the posterior left marginal sulcus. There is no corresponding dark signal on the ADC map. Findings are suggestive of subacute infarcts.    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    Ventricles and sulci are normal in size and configuration for patient's age. Incidental cavum septum lucidum. No extra axial collection. No mass effect  or edema. No evidence of hemorrhage on the gradient echo sequence. The major vessel flow voids are preserved at the skull base.  Cerebellar tonsils are in normal location. The sella is not expanded.    There is mucosal thickening in the maxillary sinuses and ethmoid air cells bilaterally. Mucous retention cysts in the right maxillary sinus. The mastoids are normal in signal. No gross orbital mass. Bone marrow signal is unremarkable.    IMPRESSION:  1. Multiple small foci of signal abnormality in the left frontal and parietal cortex as described above suggestive of subacute infarcts. No brain parenchymal hemorrhage.    2. No intracranial mass effect or midline shift.        < end of copied text >

## 2020-05-04 NOTE — PROGRESS NOTE ADULT - SUBJECTIVE AND OBJECTIVE BOX
Patient is a 64y old  Male who presents with a chief complaint of right sided facial weakness/numbness (03 May 2020 11:25)      T(F): 98.6 (05-04-20 @ 05:13), Max: 98.6 (05-04-20 @ 05:13)  HR: 69 (05-04-20 @ 05:13)  BP: 138/85 (05-04-20 @ 05:13)  RR: 18 (05-04-20 @ 05:13)  SpO2: --    PHYSICAL EXAM:  GENERAL: NAD, well-groomed, well-developed  HEAD:  Atraumatic, Normocephalic  EYES: EOMI, PERRLA, conjunctiva and sclera clear  ENMT: No tonsillar erythema, exudates, or enlargement; Moist mucous membranes, Good dentition, No lesions  NECK: Supple, No JVD, Normal thyroid  NERVOUS SYSTEM:  Alert & Oriented X3,  Motor Strength 5/5 B/L upper and lower extremities  CHEST/LUNG: Clear to percussion bilaterally; No rales, rhonchi, wheezing, or rubs  HEART: Irreg No murmurs, rubs, or gallops  ABDOMEN: Soft, Nontender, Nondistended; Bowel sounds present  EXTREMITIES:   No clubbing, cyanosis, or edema  LYMPH: No lymphadenopathy noted  SKIN: No rashes or lesions    labs  05-02    140  |  105  |  18  ----------------------------<  131<H>  4.1   |  25  |  1.2    Ca    9.6      02 May 2020 23:00  Mg     1.9     05-02                  apixaban 5 milliGRAM(s) Oral every 12 hours  atorvastatin 80 milliGRAM(s) Oral at bedtime  metoprolol succinate ER 50 milliGRAM(s) Oral daily

## 2020-05-06 DIAGNOSIS — E78.5 HYPERLIPIDEMIA, UNSPECIFIED: ICD-10-CM

## 2020-05-06 DIAGNOSIS — R29.810 FACIAL WEAKNESS: ICD-10-CM

## 2020-05-06 DIAGNOSIS — I48.92 UNSPECIFIED ATRIAL FLUTTER: ICD-10-CM

## 2020-05-06 DIAGNOSIS — I63.9 CEREBRAL INFARCTION, UNSPECIFIED: ICD-10-CM

## 2020-05-06 DIAGNOSIS — K14.8 OTHER DISEASES OF TONGUE: ICD-10-CM

## 2020-05-06 DIAGNOSIS — R47.1 DYSARTHRIA AND ANARTHRIA: ICD-10-CM

## 2020-05-06 DIAGNOSIS — R29.700 NIHSS SCORE 0: ICD-10-CM

## 2020-05-09 ENCOUNTER — EMERGENCY (EMERGENCY)
Facility: HOSPITAL | Age: 65
LOS: 0 days | Discharge: HOME | End: 2020-05-09
Attending: EMERGENCY MEDICINE | Admitting: EMERGENCY MEDICINE
Payer: COMMERCIAL

## 2020-05-09 VITALS
DIASTOLIC BLOOD PRESSURE: 78 MMHG | SYSTOLIC BLOOD PRESSURE: 149 MMHG | OXYGEN SATURATION: 100 % | TEMPERATURE: 97 F | HEART RATE: 59 BPM | RESPIRATION RATE: 17 BRPM | WEIGHT: 175.05 LBS | HEIGHT: 77 IN

## 2020-05-09 VITALS
OXYGEN SATURATION: 98 % | SYSTOLIC BLOOD PRESSURE: 136 MMHG | RESPIRATION RATE: 19 BRPM | HEART RATE: 58 BPM | DIASTOLIC BLOOD PRESSURE: 78 MMHG

## 2020-05-09 DIAGNOSIS — Z79.899 OTHER LONG TERM (CURRENT) DRUG THERAPY: ICD-10-CM

## 2020-05-09 DIAGNOSIS — R04.2 HEMOPTYSIS: ICD-10-CM

## 2020-05-09 LAB
ALBUMIN SERPL ELPH-MCNC: 5 G/DL — SIGNIFICANT CHANGE UP (ref 3.5–5.2)
ALP SERPL-CCNC: 59 U/L — SIGNIFICANT CHANGE UP (ref 30–115)
ALT FLD-CCNC: 34 U/L — SIGNIFICANT CHANGE UP (ref 0–41)
ANION GAP SERPL CALC-SCNC: 9 MMOL/L — SIGNIFICANT CHANGE UP (ref 7–14)
APTT BLD: 38 SEC — SIGNIFICANT CHANGE UP (ref 27–39.2)
AST SERPL-CCNC: 28 U/L — SIGNIFICANT CHANGE UP (ref 0–41)
BASOPHILS # BLD AUTO: 0.1 K/UL — SIGNIFICANT CHANGE UP (ref 0–0.2)
BASOPHILS # BLD AUTO: 0.11 K/UL — SIGNIFICANT CHANGE UP (ref 0–0.2)
BASOPHILS NFR BLD AUTO: 1 % — SIGNIFICANT CHANGE UP (ref 0–1)
BASOPHILS NFR BLD AUTO: 1.1 % — HIGH (ref 0–1)
BILIRUB SERPL-MCNC: 1.2 MG/DL — SIGNIFICANT CHANGE UP (ref 0.2–1.2)
BUN SERPL-MCNC: 17 MG/DL — SIGNIFICANT CHANGE UP (ref 10–20)
CALCIUM SERPL-MCNC: 10 MG/DL — SIGNIFICANT CHANGE UP (ref 8.5–10.1)
CHLORIDE SERPL-SCNC: 101 MMOL/L — SIGNIFICANT CHANGE UP (ref 98–110)
CO2 SERPL-SCNC: 30 MMOL/L — SIGNIFICANT CHANGE UP (ref 17–32)
CREAT SERPL-MCNC: 1 MG/DL — SIGNIFICANT CHANGE UP (ref 0.7–1.5)
EOSINOPHIL # BLD AUTO: 0.33 K/UL — SIGNIFICANT CHANGE UP (ref 0–0.7)
EOSINOPHIL # BLD AUTO: 0.43 K/UL — SIGNIFICANT CHANGE UP (ref 0–0.7)
EOSINOPHIL NFR BLD AUTO: 3.4 % — SIGNIFICANT CHANGE UP (ref 0–8)
EOSINOPHIL NFR BLD AUTO: 4.1 % — SIGNIFICANT CHANGE UP (ref 0–8)
GLUCOSE SERPL-MCNC: 128 MG/DL — HIGH (ref 70–99)
HCT VFR BLD CALC: 44.6 % — SIGNIFICANT CHANGE UP (ref 42–52)
HCT VFR BLD CALC: 50.2 % — SIGNIFICANT CHANGE UP (ref 42–52)
HGB BLD-MCNC: 15 G/DL — SIGNIFICANT CHANGE UP (ref 14–18)
HGB BLD-MCNC: 17.1 G/DL — SIGNIFICANT CHANGE UP (ref 14–18)
IMM GRANULOCYTES NFR BLD AUTO: 0.6 % — HIGH (ref 0.1–0.3)
IMM GRANULOCYTES NFR BLD AUTO: 0.8 % — HIGH (ref 0.1–0.3)
INR BLD: 1.23 RATIO — SIGNIFICANT CHANGE UP (ref 0.65–1.3)
LIDOCAIN IGE QN: 30 U/L — SIGNIFICANT CHANGE UP (ref 7–60)
LYMPHOCYTES # BLD AUTO: 1.63 K/UL — SIGNIFICANT CHANGE UP (ref 1.2–3.4)
LYMPHOCYTES # BLD AUTO: 1.72 K/UL — SIGNIFICANT CHANGE UP (ref 1.2–3.4)
LYMPHOCYTES # BLD AUTO: 15.7 % — LOW (ref 20.5–51.1)
LYMPHOCYTES # BLD AUTO: 17.8 % — LOW (ref 20.5–51.1)
MCHC RBC-ENTMCNC: 31.3 PG — HIGH (ref 27–31)
MCHC RBC-ENTMCNC: 31.5 PG — HIGH (ref 27–31)
MCHC RBC-ENTMCNC: 33.6 G/DL — SIGNIFICANT CHANGE UP (ref 32–37)
MCHC RBC-ENTMCNC: 34.1 G/DL — SIGNIFICANT CHANGE UP (ref 32–37)
MCV RBC AUTO: 92.6 FL — SIGNIFICANT CHANGE UP (ref 80–94)
MCV RBC AUTO: 93.1 FL — SIGNIFICANT CHANGE UP (ref 80–94)
MONOCYTES # BLD AUTO: 0.83 K/UL — HIGH (ref 0.1–0.6)
MONOCYTES # BLD AUTO: 0.97 K/UL — HIGH (ref 0.1–0.6)
MONOCYTES NFR BLD AUTO: 8.6 % — SIGNIFICANT CHANGE UP (ref 1.7–9.3)
MONOCYTES NFR BLD AUTO: 9.3 % — SIGNIFICANT CHANGE UP (ref 1.7–9.3)
NEUTROPHILS # BLD AUTO: 6.64 K/UL — HIGH (ref 1.4–6.5)
NEUTROPHILS # BLD AUTO: 7.18 K/UL — HIGH (ref 1.4–6.5)
NEUTROPHILS NFR BLD AUTO: 68.6 % — SIGNIFICANT CHANGE UP (ref 42.2–75.2)
NEUTROPHILS NFR BLD AUTO: 69 % — SIGNIFICANT CHANGE UP (ref 42.2–75.2)
NRBC # BLD: 0 /100 WBCS — SIGNIFICANT CHANGE UP (ref 0–0)
NRBC # BLD: 0 /100 WBCS — SIGNIFICANT CHANGE UP (ref 0–0)
PLATELET # BLD AUTO: 230 K/UL — SIGNIFICANT CHANGE UP (ref 130–400)
PLATELET # BLD AUTO: 281 K/UL — SIGNIFICANT CHANGE UP (ref 130–400)
POTASSIUM SERPL-MCNC: 4.1 MMOL/L — SIGNIFICANT CHANGE UP (ref 3.5–5)
POTASSIUM SERPL-SCNC: 4.1 MMOL/L — SIGNIFICANT CHANGE UP (ref 3.5–5)
PROT SERPL-MCNC: 7.6 G/DL — SIGNIFICANT CHANGE UP (ref 6–8)
PROTHROM AB SERPL-ACNC: 14.2 SEC — HIGH (ref 9.95–12.87)
RBC # BLD: 4.79 M/UL — SIGNIFICANT CHANGE UP (ref 4.7–6.1)
RBC # BLD: 5.42 M/UL — SIGNIFICANT CHANGE UP (ref 4.7–6.1)
RBC # FLD: 12 % — SIGNIFICANT CHANGE UP (ref 11.5–14.5)
RBC # FLD: 12.2 % — SIGNIFICANT CHANGE UP (ref 11.5–14.5)
SODIUM SERPL-SCNC: 140 MMOL/L — SIGNIFICANT CHANGE UP (ref 135–146)
WBC # BLD: 10.4 K/UL — SIGNIFICANT CHANGE UP (ref 4.8–10.8)
WBC # BLD: 9.68 K/UL — SIGNIFICANT CHANGE UP (ref 4.8–10.8)
WBC # FLD AUTO: 10.4 K/UL — SIGNIFICANT CHANGE UP (ref 4.8–10.8)
WBC # FLD AUTO: 9.68 K/UL — SIGNIFICANT CHANGE UP (ref 4.8–10.8)

## 2020-05-09 PROCEDURE — 99285 EMERGENCY DEPT VISIT HI MDM: CPT

## 2020-05-09 PROCEDURE — 71045 X-RAY EXAM CHEST 1 VIEW: CPT | Mod: 26

## 2020-05-09 RX ORDER — APIXABAN 2.5 MG/1
1 TABLET, FILM COATED ORAL
Qty: 0 | Refills: 0 | DISCHARGE

## 2020-05-09 NOTE — ED ADULT TRIAGE NOTE - CHIEF COMPLAINT QUOTE
As per patient "I started coughing up blood last night once and then again this morning". pt currently on Eliquis for recent diagnosis of stroke

## 2020-05-09 NOTE — ED PROVIDER NOTE - CLINICAL SUMMARY MEDICAL DECISION MAKING FREE TEXT BOX
64y male just discharged after admission for CVA (symptoms resolved) and flutter, on eliquis, presents with 3-4 episodes spitting out coffee ground material after tasting blood in mouth, denies epistaxis, denies cough, cannot really relate whether this is from his throat or stomach, no pain, no sob, no change in stool, on exam vital signs appreciated, well appearing, conj pink no blood in nares op clear neck supple cor reg lungs cta abd +bs, snt/nd, LILLIAN with brown nb stool, neuro nonfocal, labs and studies reviewed, no episodes in ED, will d/c to f/u with PMD. Patient counseled regarding conditions which should prompt return.

## 2020-05-09 NOTE — ED PROVIDER NOTE - PROGRESS NOTE DETAILS
AA: 64M recent CVA, newly diagnosed a flutter on eliquis p/w spitting up coffee ground material. No coughing or recent vomiting. Normal vitals, exam. Will order basics and coags AA: CBC normal, normal Bun/Cr ratio. Will order repeat CBC at 12PM AA: repeat Hgb shows Hgb at baseline at 15. Reassessed X3, pt has not spit up blood, coughed up blood, or experienced hematemesis since being in ED. Will dc. Strict return precautions given to pt regarding increased bleeding, hematemesis or large amount of hemoptysis, lightheadedness, syncope, CP, SOB. Pt instructed to f/u PMD and repeat CBC at some time this week.

## 2020-05-09 NOTE — ED PROVIDER NOTE - NSFOLLOWUPINSTRUCTIONS_ED_ALL_ED_FT
You have been spitting up blood that is not a large amount. Your hemoglobin was checked twice and is normal. Please follow up with your primary doctor in 1-3 days and repeat your bloodwork. If you experience a large amount of bleeding, lightheadedness, chest pain, shortness of breath, vomit up blood, cough up a large amount of blood, or pass out then please return to the ER.

## 2020-05-09 NOTE — ED PROVIDER NOTE - OBJECTIVE STATEMENT
64M h/o recent frontal and parietal CVA diagnosed 5/1, aflutter on eliquis p/w spitting up dark coffee ground colored sputum. Pt was spitting up 3-4 times at midnight last night while sleeping and 7AM this morning. Denies fever/chills, weight loss, cough, SOB, CP, abd pain, n/v/d, melena, hematochezia. Denies alcohol, NSAID use, and tobacco use.

## 2020-05-09 NOTE — ED PROVIDER NOTE - CARE PROVIDER_API CALL
Cindy Gibbs)  Internal Medicine  [address] [place] [address]  Phone: [phone]  Fax: [phone]  Follow Up Time: 1-3 Days

## 2020-05-09 NOTE — ED PROVIDER NOTE - NS ED ROS FT
General: No fever, chills, or weakness. No weight loss  Eyes:  No visual changes, eye pain or discharge.  ENMT: Spitting up coffee ground sputum. No hearing changes, pain, no sore throat or runny nose, no difficulty swallowing  Cardiac:  No chest pain, SOB or edema. No chest pain with exertion.  Respiratory:  No cough or respiratory distress. No hemoptysis. No history of asthma or RAD.  GI:  No nausea, vomiting, diarrhea or abdominal pain. No melena or hematochezia.  :  No dysuria, frequency or burning.  MS:  No myalgia, muscle weakness, joint pain or back pain.  Neuro:  No headache.  No LOC. No change in ambulation. No dizziness.  Skin:  No skin rash.

## 2020-05-09 NOTE — ED PROVIDER NOTE - PHYSICAL EXAMINATION
Constitutional: Well developed, well nourished. NAD. Good general hygiene  Head: Atraumatic.  Eyes: PERRLA. EOMI without discomfort.   ENT: No dried blood or coffee ground colored material in nasopharynx or oropharynx. No dried blood. No nasal discharge. Mucous membranes moist.  Neck: Supple. Painless ROM.  Cardiovascular: Regular rhythm. Regular rate. Normal S1 and S2. No murmurs. 2+ pulses in all extremities.   Pulmonary: Normal respiratory rate and effort. Lungs clear to auscultation bilaterally. No wheezing, rales, or rhonchi. Bilateral, equal lung expansion.   Abdominal: Soft. Nondistended. Nontender. No rebound or guarding. Rectal exam brown stool.  Extremities. Pelvis stable. No lower extremity edema. Symmetric calves.  Skin: No rashes.   Neuro: AAOx3. No focal neurological deficits.

## 2020-05-09 NOTE — ED PROVIDER NOTE - ATTENDING CONTRIBUTION TO CARE
64y male just discharged after admission for CVA (symptoms resolved) and flutter, on eliquis, presents with 3-4 episodes spitting out coffee ground material after tasting blood in mouth, denies epistaxis, denies cough, cannot really relate whether this is from his throat or stomach, no pain, no sob, no change in stool, on exam vital signs appreciated, well appearing, conj pink no blood in nares op clear neck supple cor reg lungs cta abd +bs, snt/nd, LILLIAN with brown nb stool, neuro nonfocal, will cehck labs, cxr

## 2020-05-09 NOTE — ED PROVIDER NOTE - PATIENT PORTAL LINK FT
You can access the FollowMyHealth Patient Portal offered by [facility] by registering at the following website: [email or web address]. By joining [employer]’s FollowMyHealth portal, you will also be able to view your health information using other applications (apps) compatible with our system.

## 2021-02-17 PROBLEM — I63.9 CEREBRAL INFARCTION, UNSPECIFIED: Chronic | Status: ACTIVE | Noted: 2020-05-09

## 2021-04-26 NOTE — ED PROVIDER NOTE - PR
192 Mart-1 - Negative Histology Text: MART-1 staining demonstrates a normal density and pattern of melanocytes along the dermal-epidermal junction. The surgical margins are negative for tumor cells.

## 2021-04-29 PROBLEM — Z00.00 ENCOUNTER FOR PREVENTIVE HEALTH EXAMINATION: Status: ACTIVE | Noted: 2021-04-29

## 2021-04-30 ENCOUNTER — APPOINTMENT (OUTPATIENT)
Dept: CARDIOLOGY | Facility: CLINIC | Age: 66
End: 2021-04-30
Payer: MEDICARE

## 2021-04-30 VITALS
HEIGHT: 70 IN | SYSTOLIC BLOOD PRESSURE: 101 MMHG | WEIGHT: 165 LBS | TEMPERATURE: 98 F | DIASTOLIC BLOOD PRESSURE: 76 MMHG | BODY MASS INDEX: 23.62 KG/M2 | HEART RATE: 56 BPM

## 2021-04-30 DIAGNOSIS — Z78.9 OTHER SPECIFIED HEALTH STATUS: ICD-10-CM

## 2021-04-30 PROCEDURE — 99204 OFFICE O/P NEW MOD 45 MIN: CPT

## 2021-04-30 PROCEDURE — 93000 ELECTROCARDIOGRAM COMPLETE: CPT

## 2021-04-30 PROCEDURE — 99072 ADDL SUPL MATRL&STAF TM PHE: CPT

## 2021-05-29 PROBLEM — Z78.9 CURRENT NON-DRINKER OF ALCOHOL: Status: ACTIVE | Noted: 2021-05-29

## 2021-05-29 NOTE — PHYSICAL EXAM

## 2021-05-29 NOTE — ASSESSMENT
[FreeTextEntry1] : AF - CHADVSC 3\par - Discussed with patient different options which included medical management, rate control, ablation. Patient would like to think about his options at this time.\par \par – recommend obtain labs including thyroid.\par \par – Recommend continuing AC

## 2021-05-29 NOTE — HISTORY OF PRESENT ILLNESS
[FreeTextEntry1] : Patietn  with possible remote history of ?Afib now p/w acute CVA 5/2020, also found to have new Atrial flutter w RVR. Patient currently in atrial fibrillation. Patient was referred to the office for further follow-up and for the treatment of his arrhythmia.\par \par \par \par EKG AF 90 bpm\par creatine 5/20 1.0

## 2021-08-11 ENCOUNTER — LABORATORY RESULT (OUTPATIENT)
Age: 66
End: 2021-08-11

## 2021-08-13 ENCOUNTER — APPOINTMENT (OUTPATIENT)
Dept: CARDIOLOGY | Facility: CLINIC | Age: 66
End: 2021-08-13
Payer: MEDICARE

## 2021-08-13 VITALS
WEIGHT: 165 LBS | DIASTOLIC BLOOD PRESSURE: 77 MMHG | HEART RATE: 62 BPM | TEMPERATURE: 96.9 F | HEIGHT: 70 IN | BODY MASS INDEX: 23.62 KG/M2 | SYSTOLIC BLOOD PRESSURE: 110 MMHG

## 2021-08-13 VITALS — SYSTOLIC BLOOD PRESSURE: 162 MMHG | DIASTOLIC BLOOD PRESSURE: 82 MMHG

## 2021-08-13 PROCEDURE — 99214 OFFICE O/P EST MOD 30 MIN: CPT

## 2021-08-13 PROCEDURE — 93000 ELECTROCARDIOGRAM COMPLETE: CPT

## 2021-08-13 NOTE — HISTORY OF PRESENT ILLNESS
[FreeTextEntry1] : Patietn  with possible remote history of ?Afib now p/w acute CVA 5/2020, also found to have new Atrial flutter w RVR. Patient currently in atrial fibrillation. Patient was referred to the office for further follow-up and for the treatment of his arrhythmia.\par \par Pt had no further symptomatic episodes\par \par \par \par EKG  62 bpm\par creatine 5/20 1.0

## 2021-08-13 NOTE — ASSESSMENT
[FreeTextEntry1] : AF - CHADVSC 3\par - Discussed with patient different options which included medical management, rate control, ablation. Patient would like to think about his options at this time.\par \par – Recommend continuing AC\par \par - event monitor to evaluate AF burden; discussed possibility of ablation

## 2021-08-13 NOTE — PHYSICAL EXAM

## 2021-09-17 ENCOUNTER — NON-APPOINTMENT (OUTPATIENT)
Age: 66
End: 2021-09-17

## 2021-09-17 ENCOUNTER — APPOINTMENT (OUTPATIENT)
Dept: CARDIOLOGY | Facility: CLINIC | Age: 66
End: 2021-09-17
Payer: MEDICARE

## 2021-09-17 VITALS
RESPIRATION RATE: 16 BRPM | SYSTOLIC BLOOD PRESSURE: 120 MMHG | OXYGEN SATURATION: 99 % | BODY MASS INDEX: 23.62 KG/M2 | HEART RATE: 55 BPM | DIASTOLIC BLOOD PRESSURE: 60 MMHG | HEIGHT: 70 IN | WEIGHT: 165 LBS | TEMPERATURE: 97.7 F

## 2021-09-17 PROCEDURE — 99213 OFFICE O/P EST LOW 20 MIN: CPT

## 2021-09-17 PROCEDURE — 93000 ELECTROCARDIOGRAM COMPLETE: CPT

## 2021-09-17 RX ORDER — APIXABAN 5 MG/1
5 TABLET, FILM COATED ORAL
Qty: 90 | Refills: 3 | Status: DISCONTINUED | COMMUNITY
End: 2021-09-17

## 2021-09-17 RX ORDER — ATORVASTATIN CALCIUM 40 MG/1
40 TABLET, FILM COATED ORAL DAILY
Refills: 0 | Status: DISCONTINUED | COMMUNITY
End: 2021-09-17

## 2021-10-17 NOTE — ASSESSMENT
[FreeTextEntry1] : AF - CHADVSC 3\par - Discussed with patient different options which included medical management, rate control, ablation. Patient would like to think about his options at this time.\par \par – Recommend continuing AC\par \par - event monitor to evaluate AF burden 17%\par - Discuss with patient different options which included ablation. Patient at this time is not sure what he wants to do. He wants to continue to monitor at this time. We will revisit possibly ablation next visit.

## 2021-10-17 NOTE — HISTORY OF PRESENT ILLNESS
[FreeTextEntry1] : Patietn  with possible remote history of ?Afib now p/w acute CVA 5/2020, also found to have new Atrial flutter w RVR. Patient currently in atrial fibrillation. Patient was referred to the office for further follow-up and for the treatment of his arrhythmia.\par \par Pt had no further symptomatic episodes\par \par \par \par EKG  62 bpm\par creatine 5/20 1.0\par event monitor - 17% AF burden; small termination pause at night

## 2021-10-17 NOTE — PHYSICAL EXAM

## 2021-12-03 ENCOUNTER — APPOINTMENT (OUTPATIENT)
Dept: CARDIOLOGY | Facility: CLINIC | Age: 66
End: 2021-12-03
Payer: MEDICARE

## 2021-12-03 VITALS
WEIGHT: 165 LBS | SYSTOLIC BLOOD PRESSURE: 175 MMHG | HEIGHT: 70 IN | DIASTOLIC BLOOD PRESSURE: 94 MMHG | BODY MASS INDEX: 23.62 KG/M2 | TEMPERATURE: 97.9 F

## 2021-12-03 PROCEDURE — 93000 ELECTROCARDIOGRAM COMPLETE: CPT

## 2021-12-03 PROCEDURE — 99214 OFFICE O/P EST MOD 30 MIN: CPT

## 2021-12-03 NOTE — HISTORY OF PRESENT ILLNESS
[FreeTextEntry1] : Patietn  with possible remote history of ?Afib now p/w acute CVA 5/2020, also found to have new Atrial flutter w RVR. Patient currently in atrial fibrillation. Patient was referred to the office for further follow-up and for the treatment of his arrhythmia.\par \par Pt had no further symptomatic episodes\par \par \par \par EKG  60 bpm\par  \par creatine 5/20 1.0\par event monitor - 17% AF burden; small termination pause at night

## 2021-12-03 NOTE — ASSESSMENT
[FreeTextEntry1] : AF - CHADVSC 3\par - Discussed with patient different options which included medical management, rate control, ablation. Patient would like to think about his options at this time.\par \par – Recommend continuing AC\par \par - event monitor to evaluate AF burden 17%\par - patient wants to continue to monitor at this time. We will revisit possibly ablation but not..

## 2021-12-03 NOTE — PHYSICAL EXAM

## 2022-05-06 ENCOUNTER — APPOINTMENT (OUTPATIENT)
Dept: CARDIOLOGY | Facility: CLINIC | Age: 67
End: 2022-05-06
Payer: MEDICARE

## 2022-05-06 VITALS
BODY MASS INDEX: 23.62 KG/M2 | HEART RATE: 62 BPM | WEIGHT: 165 LBS | TEMPERATURE: 98 F | DIASTOLIC BLOOD PRESSURE: 72 MMHG | RESPIRATION RATE: 16 BRPM | HEIGHT: 70 IN | SYSTOLIC BLOOD PRESSURE: 116 MMHG

## 2022-05-06 PROCEDURE — 99214 OFFICE O/P EST MOD 30 MIN: CPT

## 2022-05-06 PROCEDURE — 93000 ELECTROCARDIOGRAM COMPLETE: CPT

## 2022-05-06 NOTE — ASSESSMENT
[FreeTextEntry1] : AF - CHADVSC 3\par - Discussed with patient different options which included medical management, rate control, ablation. Patient wants to prceed with meds only \par \par – Recommend continuing AC\par \par - event monitor to evaluate AF burden 17%\par - patient wants to continue to monitor at this time. We will revisit possibly ablation but not now

## 2022-05-06 NOTE — HISTORY OF PRESENT ILLNESS
[FreeTextEntry1] : Patietn  with possible remote history of ?Afib now p/w acute CVA 5/2020, also found to have new Atrial flutter w RVR. Patient currently in atrial fibrillation. Patient was referred to the office for further follow-up and for the treatment of his arrhythmia.\par \par Pt had no further symptomatic episodes. Pt is tolerating AC well. no bleeding\par \par \par \par EKG  64 bpm\par  \par creatine 5/20 1.0\par event monitor - 17% AF burden; small termination pause at night

## 2022-05-06 NOTE — PHYSICAL EXAM

## 2022-12-09 ENCOUNTER — APPOINTMENT (OUTPATIENT)
Dept: CARDIOLOGY | Facility: CLINIC | Age: 67
End: 2022-12-09

## 2022-12-09 VITALS
BODY MASS INDEX: 25.77 KG/M2 | WEIGHT: 180 LBS | DIASTOLIC BLOOD PRESSURE: 80 MMHG | HEART RATE: 61 BPM | SYSTOLIC BLOOD PRESSURE: 120 MMHG | TEMPERATURE: 97.1 F | RESPIRATION RATE: 16 BRPM | HEIGHT: 70 IN

## 2022-12-09 PROCEDURE — 93000 ELECTROCARDIOGRAM COMPLETE: CPT

## 2022-12-09 PROCEDURE — 99215 OFFICE O/P EST HI 40 MIN: CPT | Mod: 57,25

## 2022-12-14 ENCOUNTER — APPOINTMENT (OUTPATIENT)
Dept: CARDIOLOGY | Facility: CLINIC | Age: 67
End: 2022-12-14

## 2022-12-22 ENCOUNTER — APPOINTMENT (OUTPATIENT)
Dept: CARDIOLOGY | Facility: CLINIC | Age: 67
End: 2022-12-22
Payer: MEDICARE

## 2022-12-22 PROCEDURE — 93306 TTE W/DOPPLER COMPLETE: CPT

## 2022-12-22 NOTE — ADDENDUM
[FreeTextEntry1] : IArina assisted in documentation on 12/09/2022 acting as a scribe for Dr. Zackery Aquino.\par

## 2022-12-22 NOTE — HISTORY OF PRESENT ILLNESS
[FreeTextEntry1] : Patietn  with possible remote history of ?Afib now p/w acute CVA 5/2020, also found to have new Atrial flutter w RVR. Patient currently in atrial fibrillation. Patient was referred to the office for further follow-up and for the treatment of his arrhythmia. \par \par In the recent 6 months, patient developed atrial fibrillation. He states that he is in AF all the time. He comes to the office for further evaluation.\par \par Pt had no further symptomatic episodes. Pt is tolerating AC well. no bleeding\par \par \par \par  EKG (12/09/2022) AF at 61 bpm.\par creatine 5/20 1.0\par event monitor - 17% AF burden; small termination pause at night

## 2022-12-22 NOTE — PHYSICAL EXAM

## 2022-12-22 NOTE — ASSESSMENT
[FreeTextEntry1] : Atrial Fibrillation \par \par - CHADSVASC of 3.\par - Continue Eliquis 5 mg Q12, no bleeding. \par - Discussed with patient different options, which include cardioversion or ablation. After discussing risks and benefits of each of the options, patient would like to proceed with ablation. I also discussed with patient the possibility to enroll in the iCLAS trial. Patient would like to proceed to be enrolled in the trial. I have also provided patient with the consent form to review at home. \par - I have discussed different treatment options with ZUHAIR SONG  including anti-arrhythmic medications or ablation.  After a long discussion, the patient would like to proceed with an ablation.  I have explained the risks and benefits of the procedure to the patient.  There is approximately 1-2% chance of any major cardiovascular complication to occur. Complications include, but are not limited to infection, bleeding, damage to the vessels, hole in the heart, stroke, death and heart attack. There is also 1% risk of phrenic nerve injury.  The patient understands the risk and would like to proceed with the procedure. Patient had opportunity to ask questions. Patient indicated that all of his questions were answered to his satisfaction and verbalized understanding.\par \par

## 2023-09-14 ENCOUNTER — NON-APPOINTMENT (OUTPATIENT)
Age: 68
End: 2023-09-14

## 2023-09-14 DIAGNOSIS — R41.3 OTHER AMNESIA: ICD-10-CM

## 2023-09-14 DIAGNOSIS — Z87.898 PERSONAL HISTORY OF OTHER SPECIFIED CONDITIONS: ICD-10-CM

## 2023-09-14 DIAGNOSIS — M25.529 PAIN IN UNSPECIFIED ELBOW: ICD-10-CM

## 2023-09-14 DIAGNOSIS — I69.30 UNSPECIFIED SEQUELAE OF CEREBRAL INFARCTION: ICD-10-CM

## 2023-10-13 ENCOUNTER — APPOINTMENT (OUTPATIENT)
Dept: ELECTROPHYSIOLOGY | Facility: CLINIC | Age: 68
End: 2023-10-13
Payer: MEDICARE

## 2023-10-13 VITALS
WEIGHT: 180 LBS | RESPIRATION RATE: 18 BRPM | SYSTOLIC BLOOD PRESSURE: 126 MMHG | HEIGHT: 70 IN | DIASTOLIC BLOOD PRESSURE: 84 MMHG | HEART RATE: 67 BPM | TEMPERATURE: 97.1 F | BODY MASS INDEX: 25.77 KG/M2

## 2023-10-13 PROCEDURE — 99214 OFFICE O/P EST MOD 30 MIN: CPT | Mod: 25

## 2023-10-13 PROCEDURE — 93000 ELECTROCARDIOGRAM COMPLETE: CPT

## 2023-10-13 RX ORDER — ATORVASTATIN CALCIUM 10 MG/1
10 TABLET, FILM COATED ORAL DAILY
Refills: 0 | Status: ACTIVE | COMMUNITY

## 2023-10-13 RX ORDER — METOPROLOL SUCCINATE 25 MG/1
25 TABLET, EXTENDED RELEASE ORAL
Qty: 90 | Refills: 3 | Status: ACTIVE | COMMUNITY
Start: 1900-01-01 | End: 1900-01-01

## 2023-11-30 ENCOUNTER — APPOINTMENT (OUTPATIENT)
Dept: ELECTROPHYSIOLOGY | Facility: CLINIC | Age: 68
End: 2023-11-30

## 2024-01-05 ENCOUNTER — APPOINTMENT (OUTPATIENT)
Dept: ELECTROPHYSIOLOGY | Facility: CLINIC | Age: 69
End: 2024-01-05
Payer: MEDICARE

## 2024-01-05 VITALS
WEIGHT: 168.06 LBS | BODY MASS INDEX: 24.89 KG/M2 | HEART RATE: 66 BPM | SYSTOLIC BLOOD PRESSURE: 107 MMHG | HEIGHT: 69 IN | DIASTOLIC BLOOD PRESSURE: 74 MMHG | OXYGEN SATURATION: 97 % | TEMPERATURE: 97.1 F

## 2024-01-05 DIAGNOSIS — I48.91 UNSPECIFIED ATRIAL FIBRILLATION: ICD-10-CM

## 2024-01-05 PROCEDURE — 93000 ELECTROCARDIOGRAM COMPLETE: CPT

## 2024-01-05 PROCEDURE — 99214 OFFICE O/P EST MOD 30 MIN: CPT | Mod: 25

## 2024-01-05 RX ORDER — APIXABAN 5 MG/1
5 TABLET, FILM COATED ORAL DAILY
Qty: 180 | Refills: 3 | Status: ACTIVE | COMMUNITY
Start: 1900-01-01 | End: 1900-01-01

## 2024-01-28 NOTE — PHYSICAL EXAM

## 2024-01-28 NOTE — ASSESSMENT
[FreeTextEntry1] : Atrial fibrillation, CHADSVASC of 3, history of CVA  - Continue Eliquis 5 mg q12, no bleeding.  - Continue Metoprolol 25 mg once a day.   - I discussed with patient the possibility of ablation. However, he wants to hold off for now. Will continue to monitor.

## 2024-01-28 NOTE — HISTORY OF PRESENT ILLNESS
[FreeTextEntry1] : Patietn  with possible remote history of ?Afib now p/w acute CVA 5/2020, CHADVSC 3 also found to have new Atrial flutter w RVR. Patient currently in atrial fibrillation. Patient was referred to the office for further follow-up and for the treatment of his arrhythmia.   In the recent 6 months, patient developed atrial fibrillation. He states that he is in AF all the time. He comes to the office for further evaluation.  Patient comes to my office today for routine follow-up. Patient feels well. He complains of some shortness of breath, however he is feeling well.      EKG (01/05/2024): AF at 66 bpm EKG (10/13/2023): AF at 67 bpm.  EKG (12/09/2022) AF at 61 bpm. creatine 5/20 1.0 event monitor - 17% AF burden; small termination pause at night

## 2024-01-28 NOTE — ADDENDUM
[FreeTextEntry1] : Arina CINTRON assisted in documentation on 01/05/2024   acting as a scribe for Dr. Zackery Aquino.

## 2024-06-02 ENCOUNTER — EMERGENCY (EMERGENCY)
Facility: HOSPITAL | Age: 69
LOS: 0 days | Discharge: HOME | End: 2024-06-03
Attending: EMERGENCY MEDICINE
Payer: MEDICARE

## 2024-06-02 DIAGNOSIS — I10 ESSENTIAL (PRIMARY) HYPERTENSION: ICD-10-CM
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
## 2024-06-02 DIAGNOSIS — T24.531A: ICD-10-CM
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
## 2024-06-02 DIAGNOSIS — Z79.01 LONG TERM (CURRENT) USE OF ANTICOAGULANTS: ICD-10-CM

## 2024-06-02 DIAGNOSIS — Z86.73 PERSONAL HISTORY OF TRANSIENT ISCHEMIC ATTACK (TIA), AND CEREBRAL INFARCTION WITHOUT RESIDUAL DEFICITS: ICD-10-CM

## 2024-06-02 DIAGNOSIS — T32.0 CORROSIONS INVOLVING LESS THAN 10% OF BODY SURFACE: ICD-10-CM

## 2024-06-02 DIAGNOSIS — M79.604 PAIN IN RIGHT LEG: ICD-10-CM

## 2024-06-02 DIAGNOSIS — L03.115 CELLULITIS OF RIGHT LOWER LIMB: ICD-10-CM

## 2024-06-02 PROCEDURE — 99283 EMERGENCY DEPT VISIT LOW MDM: CPT | Mod: 25

## 2024-06-02 PROCEDURE — 90715 TDAP VACCINE 7 YRS/> IM: CPT

## 2024-06-02 PROCEDURE — 99284 EMERGENCY DEPT VISIT MOD MDM: CPT

## 2024-06-03 VITALS
HEART RATE: 75 BPM | DIASTOLIC BLOOD PRESSURE: 78 MMHG | SYSTOLIC BLOOD PRESSURE: 138 MMHG | OXYGEN SATURATION: 100 % | WEIGHT: 149.91 LBS | RESPIRATION RATE: 18 BRPM | TEMPERATURE: 98 F

## 2024-06-03 RX ORDER — TETANUS TOXOID, REDUCED DIPHTHERIA TOXOID AND ACELLULAR PERTUSSIS VACCINE, ADSORBED 5; 2.5; 8; 8; 2.5 [IU]/.5ML; [IU]/.5ML; UG/.5ML; UG/.5ML; UG/.5ML
0.5 SUSPENSION INTRAMUSCULAR ONCE
Refills: 0 | Status: COMPLETED | OUTPATIENT
Start: 2024-06-03 | End: 2024-06-03

## 2024-06-03 RX ADMIN — Medication 300 MILLIGRAM(S): at 00:56

## 2024-06-03 RX ADMIN — TETANUS TOXOID, REDUCED DIPHTHERIA TOXOID AND ACELLULAR PERTUSSIS VACCINE, ADSORBED 0.5 MILLILITER(S): 5; 2.5; 8; 8; 2.5 SUSPENSION INTRAMUSCULAR at 00:55

## 2024-06-03 NOTE — ED ADULT NURSE NOTE - NSFALLUNIVINTERV_ED_ALL_ED
Bed/Stretcher in lowest position, wheels locked, appropriate side rails in place/Call bell, personal items and telephone in reach/Instruct patient to call for assistance before getting out of bed/chair/stretcher/Non-slip footwear applied when patient is off stretcher/[place] to call system/Physically safe environment - no spills, clutter or unnecessary equipment/Purposeful proactive rounding/Room/bathroom lighting operational, light cord in reach

## 2024-06-03 NOTE — ED PROVIDER NOTE - PATIENT PORTAL LINK FT
You can access the FollowMyHealth Patient Portal offered by [facility] by registering at the following website: [email or web address]. By joining [employer]’s FollowMyHealth portal, you will also be able to view your health information using other applications (apps) compatible with our system.
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
## 2024-06-03 NOTE — ED PROVIDER NOTE - NSFOLLOWUPCLINICS_GEN_ALL_ED_FT
[facility]-[address]  Burn  [address], [address]  [place] [address]  Phone: [phone]  Fax:

## 2024-06-03 NOTE — ED PROVIDER NOTE - PHYSICAL EXAMINATION
CONSTITUTIONAL: Well-appearing; in no apparent distress.   EYES: PERRL; EOM intact.   MS: 2+ right DP pulse.  Right foot neurovascular intact.  Right foot, ankle and knee with range of motion.  Ambulate with steady gait.  SKIN: 3 to 4 small skin ulcers noted to the inferior posterior aspect of right lower calf with surrounding erythema and warmness.  Skin ulcer with granulating tissues.  NEURO/PSYCH: A & O x 4; grossly unremarkable.
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
## 2024-10-04 ENCOUNTER — APPOINTMENT (OUTPATIENT)
Dept: ELECTROPHYSIOLOGY | Facility: CLINIC | Age: 69
End: 2024-10-04